# Patient Record
Sex: MALE | Race: WHITE | Employment: OTHER | URBAN - NONMETROPOLITAN AREA
[De-identification: names, ages, dates, MRNs, and addresses within clinical notes are randomized per-mention and may not be internally consistent; named-entity substitution may affect disease eponyms.]

---

## 2019-08-24 ENCOUNTER — APPOINTMENT (OUTPATIENT)
Dept: GENERAL RADIOLOGY | Age: 79
End: 2019-08-24
Payer: OTHER MISCELLANEOUS

## 2019-08-24 ENCOUNTER — APPOINTMENT (OUTPATIENT)
Dept: CT IMAGING | Age: 79
End: 2019-08-24
Payer: OTHER MISCELLANEOUS

## 2019-08-24 ENCOUNTER — HOSPITAL ENCOUNTER (EMERGENCY)
Age: 79
Discharge: OTHER INSTITUTION | End: 2019-08-24
Attending: EMERGENCY MEDICINE
Payer: OTHER MISCELLANEOUS

## 2019-08-24 VITALS
RESPIRATION RATE: 17 BRPM | HEIGHT: 72 IN | BODY MASS INDEX: 27.77 KG/M2 | SYSTOLIC BLOOD PRESSURE: 183 MMHG | TEMPERATURE: 97.7 F | HEART RATE: 45 BPM | WEIGHT: 205 LBS | OXYGEN SATURATION: 100 % | DIASTOLIC BLOOD PRESSURE: 83 MMHG
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
## 2019-08-24 DIAGNOSIS — M25.561 ACUTE PAIN OF RIGHT KNEE: ICD-10-CM

## 2019-08-24 DIAGNOSIS — M25.521 RIGHT ELBOW PAIN: ICD-10-CM

## 2019-08-24 DIAGNOSIS — R07.9 CHEST PAIN, UNSPECIFIED TYPE: ICD-10-CM

## 2019-08-24 DIAGNOSIS — R77.8 ELEVATED TROPONIN: ICD-10-CM

## 2019-08-24 DIAGNOSIS — T14.8XXA ABRASION: ICD-10-CM

## 2019-08-24 DIAGNOSIS — R07.81 RIB PAIN: ICD-10-CM

## 2019-08-24 LAB
ALBUMIN SERPL-MCNC: 4 GM/DL (ref 3.4–5)
ALP BLD-CCNC: 67 IU/L (ref 40–129)
ALT SERPL-CCNC: 31 U/L (ref 10–40)
ANION GAP SERPL CALCULATED.3IONS-SCNC: 17 MMOL/L (ref 4–16)
AST SERPL-CCNC: 44 IU/L (ref 15–37)
BACTERIA: ABNORMAL /HPF
BASOPHILS ABSOLUTE: 0.1 K/CU MM
BASOPHILS RELATIVE PERCENT: 0.6 % (ref 0–1)
BILIRUB SERPL-MCNC: 0.7 MG/DL (ref 0–1)
BILIRUBIN URINE: NEGATIVE MG/DL
BLOOD, URINE: ABNORMAL
BUN BLDV-MCNC: 15 MG/DL (ref 6–23)
CALCIUM SERPL-MCNC: 9.1 MG/DL (ref 8.3–10.6)
CAST TYPE: NEGATIVE /HPF
CHLORIDE BLD-SCNC: 106 MMOL/L (ref 99–110)
CLARITY: ABNORMAL
CO2: 20 MMOL/L (ref 21–32)
COLOR: YELLOW
CREAT SERPL-MCNC: 1.1 MG/DL (ref 0.9–1.3)
CRYSTAL TYPE: NEGATIVE /HPF
DIFFERENTIAL TYPE: ABNORMAL
EOSINOPHILS ABSOLUTE: 0.1 K/CU MM
EOSINOPHILS RELATIVE PERCENT: 1.1 % (ref 0–3)
EPITHELIAL CELLS, UA: ABNORMAL /HPF
GFR AFRICAN AMERICAN: >60 ML/MIN/1.73M2
GFR NON-AFRICAN AMERICAN: >60 ML/MIN/1.73M2
GLUCOSE BLD-MCNC: 103 MG/DL (ref 70–99)
GLUCOSE, URINE: NEGATIVE MG/DL
HCT VFR BLD CALC: 40 % (ref 42–52)
HEMOGLOBIN: 12.8 GM/DL (ref 13.5–18)
IMMATURE NEUTROPHIL %: 0.6 % (ref 0–0.43)
KETONES, URINE: ABNORMAL MG/DL
LEUKOCYTE ESTERASE, URINE: ABNORMAL
LIPASE: 58 IU/L (ref 13–60)
LYMPHOCYTES ABSOLUTE: 2.4 K/CU MM
LYMPHOCYTES RELATIVE PERCENT: 27.4 % (ref 24–44)
MCH RBC QN AUTO: 28.8 PG (ref 27–31)
MCHC RBC AUTO-ENTMCNC: 32 % (ref 32–36)
MCV RBC AUTO: 89.9 FL (ref 78–100)
MONOCYTES ABSOLUTE: 0.8 K/CU MM
MONOCYTES RELATIVE PERCENT: 8.9 % (ref 0–4)
NITRITE URINE, QUANTITATIVE: NEGATIVE
PDW BLD-RTO: 15.2 % (ref 11.7–14.9)
PH, URINE: 6 (ref 5–8)
PLATELET # BLD: 195 K/CU MM (ref 140–440)
PMV BLD AUTO: 11.6 FL (ref 7.5–11.1)
POTASSIUM SERPL-SCNC: 4.1 MMOL/L (ref 3.5–5.1)
PRO-BNP: 643.1 PG/ML
PROTEIN UA: ABNORMAL MG/DL
RBC # BLD: 4.45 M/CU MM (ref 4.6–6.2)
RBC URINE: ABNORMAL /HPF (ref 0–3)
SEGMENTED NEUTROPHILS ABSOLUTE COUNT: 5.5 K/CU MM
SEGMENTED NEUTROPHILS RELATIVE PERCENT: 61.4 % (ref 36–66)
SODIUM BLD-SCNC: 143 MMOL/L (ref 135–145)
SPECIFIC GRAVITY UA: 1.01 (ref 1–1.03)
TOTAL IMMATURE NEUTOROPHIL: 0.05 K/CU MM
TOTAL PROTEIN: 6.4 GM/DL (ref 6.4–8.2)
TROPONIN T: 0.01 NG/ML
UROBILINOGEN, URINE: 0.2 MG/DL (ref 0.2–1)
WBC # BLD: 8.9 K/CU MM (ref 4–10.5)
WBC UA: ABNORMAL /HPF (ref 0–2)

## 2019-08-24 PROCEDURE — 72131 CT LUMBAR SPINE W/O DYE: CPT

## 2019-08-24 PROCEDURE — 73562 X-RAY EXAM OF KNEE 3: CPT

## 2019-08-24 PROCEDURE — 96374 THER/PROPH/DIAG INJ IV PUSH: CPT

## 2019-08-24 PROCEDURE — 93005 ELECTROCARDIOGRAM TRACING: CPT | Performed by: EMERGENCY MEDICINE

## 2019-08-24 PROCEDURE — 81001 URINALYSIS AUTO W/SCOPE: CPT

## 2019-08-24 PROCEDURE — 73070 X-RAY EXAM OF ELBOW: CPT

## 2019-08-24 PROCEDURE — 85025 COMPLETE CBC W/AUTO DIFF WBC: CPT

## 2019-08-24 PROCEDURE — 72128 CT CHEST SPINE W/O DYE: CPT

## 2019-08-24 PROCEDURE — 6360000002 HC RX W HCPCS: Performed by: EMERGENCY MEDICINE

## 2019-08-24 PROCEDURE — 72125 CT NECK SPINE W/O DYE: CPT

## 2019-08-24 PROCEDURE — 70486 CT MAXILLOFACIAL W/O DYE: CPT

## 2019-08-24 PROCEDURE — 80053 COMPREHEN METABOLIC PANEL: CPT

## 2019-08-24 PROCEDURE — 96375 TX/PRO/DX INJ NEW DRUG ADDON: CPT

## 2019-08-24 PROCEDURE — 99285 EMERGENCY DEPT VISIT HI MDM: CPT

## 2019-08-24 PROCEDURE — 70450 CT HEAD/BRAIN W/O DYE: CPT

## 2019-08-24 PROCEDURE — 71045 X-RAY EXAM CHEST 1 VIEW: CPT

## 2019-08-24 PROCEDURE — 83690 ASSAY OF LIPASE: CPT

## 2019-08-24 PROCEDURE — 74174 CTA ABD&PLVS W/CONTRAST: CPT

## 2019-08-24 PROCEDURE — 83880 ASSAY OF NATRIURETIC PEPTIDE: CPT

## 2019-08-24 PROCEDURE — 84484 ASSAY OF TROPONIN QUANT: CPT

## 2019-08-24 PROCEDURE — 6360000004 HC RX CONTRAST MEDICATION: Performed by: EMERGENCY MEDICINE

## 2019-08-24 RX ORDER — LISINOPRIL 5 MG/1
5 TABLET ORAL DAILY
COMMUNITY

## 2019-08-24 RX ORDER — PREDNISOLONE ACETATE 10 MG/ML
1 SUSPENSION/ DROPS OPHTHALMIC DAILY
COMMUNITY

## 2019-08-24 RX ORDER — ATORVASTATIN CALCIUM 10 MG/1
10 TABLET, FILM COATED ORAL DAILY
COMMUNITY

## 2019-08-24 RX ORDER — AMIODARONE HYDROCHLORIDE 200 MG/1
200 TABLET ORAL DAILY
COMMUNITY

## 2019-08-24 RX ORDER — ONDANSETRON 2 MG/ML
4 INJECTION INTRAMUSCULAR; INTRAVENOUS EVERY 30 MIN PRN
Status: DISCONTINUED | OUTPATIENT
Start: 2019-08-24 | End: 2019-08-24 | Stop reason: HOSPADM

## 2019-08-24 RX ORDER — KETOROLAC TROMETHAMINE 5 MG/ML
1 SOLUTION OPHTHALMIC 4 TIMES DAILY
COMMUNITY

## 2019-08-24 RX ORDER — TAMSULOSIN HYDROCHLORIDE 0.4 MG/1
0.4 CAPSULE ORAL DAILY
COMMUNITY

## 2019-08-24 RX ORDER — MORPHINE SULFATE 2 MG/ML
2 INJECTION, SOLUTION INTRAMUSCULAR; INTRAVENOUS EVERY 30 MIN PRN
Status: DISCONTINUED | OUTPATIENT
Start: 2019-08-24 | End: 2019-08-24 | Stop reason: HOSPADM

## 2019-08-24 RX ADMIN — IOPAMIDOL 75 ML: 755 INJECTION, SOLUTION INTRAVENOUS at 12:19

## 2019-08-24 RX ADMIN — ONDANSETRON 4 MG: 2 INJECTION INTRAMUSCULAR; INTRAVENOUS at 11:27

## 2019-08-24 RX ADMIN — MORPHINE SULFATE 2 MG: 2 INJECTION, SOLUTION INTRAMUSCULAR; INTRAVENOUS at 11:27

## 2019-08-24 RX ADMIN — MORPHINE SULFATE 2 MG: 2 INJECTION, SOLUTION INTRAMUSCULAR; INTRAVENOUS at 15:06

## 2019-08-24 ASSESSMENT — PAIN DESCRIPTION - ORIENTATION: ORIENTATION: RIGHT

## 2019-08-24 ASSESSMENT — PAIN DESCRIPTION - PAIN TYPE: TYPE: ACUTE PAIN

## 2019-08-24 ASSESSMENT — ENCOUNTER SYMPTOMS
SHORTNESS OF BREATH: 1
ABDOMINAL PAIN: 1
ALLERGIC/IMMUNOLOGIC NEGATIVE: 1
EYES NEGATIVE: 1

## 2019-08-24 ASSESSMENT — PAIN SCALES - GENERAL
PAINLEVEL_OUTOF10: 10
PAINLEVEL_OUTOF10: 10
PAINLEVEL_OUTOF10: 3

## 2019-08-24 ASSESSMENT — PAIN DESCRIPTION - PROGRESSION: CLINICAL_PROGRESSION: GRADUALLY WORSENING

## 2019-08-24 ASSESSMENT — PAIN - FUNCTIONAL ASSESSMENT: PAIN_FUNCTIONAL_ASSESSMENT: PREVENTS OR INTERFERES SOME ACTIVE ACTIVITIES AND ADLS

## 2019-08-24 ASSESSMENT — PAIN DESCRIPTION - DESCRIPTORS: DESCRIPTORS: SHARP

## 2019-08-24 ASSESSMENT — PAIN DESCRIPTION - ONSET: ONSET: SUDDEN

## 2019-08-24 ASSESSMENT — PAIN DESCRIPTION - FREQUENCY: FREQUENCY: CONTINUOUS

## 2019-08-24 NOTE — ED PROVIDER NOTES
prednisoLONE acetate (PRED FORTE) 1 % ophthalmic suspension 1 drop daily      amiodarone (CORDARONE) 200 MG tablet Take 200 mg by mouth daily        No Known Allergies  Current Facility-Administered Medications   Medication Dose Route Frequency Provider Last Rate Last Dose    ondansetron (ZOFRAN) injection 4 mg  4 mg Intravenous Q30 Min PRN Rosaaracelisnd Acharya, DO   4 mg at 08/24/19 1127    morphine (PF) injection 2 mg  2 mg Intravenous Q30 Min PRN Lalit Acharya, DO   2 mg at 08/24/19 1127     Current Outpatient Medications   Medication Sig Dispense Refill    atorvastatin (LIPITOR) 10 MG tablet Take 10 mg by mouth daily      ketorolac (ACULAR) 0.5 % ophthalmic solution 1 drop 4 times daily      tamsulosin (FLOMAX) 0.4 MG capsule Take 0.4 mg by mouth daily      lisinopril (PRINIVIL;ZESTRIL) 5 MG tablet Take 5 mg by mouth daily      apixaban (ELIQUIS) 5 MG TABS tablet Take 5 mg by mouth 2 times daily      prednisoLONE acetate (PRED FORTE) 1 % ophthalmic suspension 1 drop daily      amiodarone (CORDARONE) 200 MG tablet Take 200 mg by mouth daily         Nursing Notes Reviewed    VITAL SIGNS:  ED Triage Vitals   Enc Vitals Group      BP       Pulse       Resp       Temp       Temp src       SpO2       Weight       Height       Head Circumference       Peak Flow       Pain Score       Pain Loc       Pain Edu? Excl. in [address]? PHYSICAL EXAM:  Physical Exam   Constitutional: He is oriented to person, place, and time. He appears well-developed and well-nourished. He is active and cooperative. Non-toxic appearance. He does not have a sickly appearance. He does not appear ill. No distress. Cervical collar in place. HENT:   Head: Normocephalic. Head is with abrasion. Right Ear: External ear normal.   Left Ear: External ear normal.   Mouth/Throat: Oropharynx is clear and moist. No oropharyngeal exudate. Eyes: Pupils are equal, round, and reactive to light.  Conjunctivae and EOM are normal. Right eye andinterpreted all of the currently available lab results from this visit (if applicable):    Results for orders placed or performed during the hospital encounter of 08/24/19   CBC Auto Differential   Result Value Ref Range    WBC 8.9 4.0 - 10.5 K/CU MM    RBC 4.45 (L) 4.6 - 6.2 M/CU MM    Hemoglobin 12.8 (L) 13.5 - 18.0 GM/DL    Hematocrit 40.0 (L) 42 - 52 %    MCV 89.9 78 - 100 FL    MCH 28.8 27 - 31 PG    MCHC 32.0 32.0 - 36.0 %    RDW 15.2 (H) 11.7 - 14.9 %    Platelets 522 064 - 479 K/CU MM    MPV 11.6 (H) 7.5 - 11.1 FL    Differential Type AUTOMATED DIFFERENTIAL     Segs Relative 61.4 36 - 66 %    Lymphocytes % 27.4 24 - 44 %    Monocytes % 8.9 (H) 0 - 4 %    Eosinophils % 1.1 0 - 3 %    Basophils % 0.6 0 - 1 %    Segs Absolute 5.5 K/CU MM    Lymphocytes Absolute 2.4 K/CU MM    Monocytes Absolute 0.8 K/CU MM    Eosinophils Absolute 0.1 K/CU MM    Basophils Absolute 0.1 K/CU MM    Immature Neutrophil % 0.6 (H) 0 - 0.43 %    Total Immature Neutrophil 0.05 K/CU MM   CMP   Result Value Ref Range    Sodium 143 135 - 145 MMOL/L    Potassium 4.1 3.5 - 5.1 MMOL/L    Chloride 106 99 - 110 mMol/L    CO2 20 (L) 21 - 32 MMOL/L    BUN 15 6 - 23 MG/DL    CREATININE 1.1 0.9 - 1.3 MG/DL    Glucose 103 (H) 70 - 99 MG/DL    Calcium 9.1 8.3 - 10.6 MG/DL    Alb 4.0 3.4 - 5.0 GM/DL    Total Protein 6.4 6.4 - 8.2 GM/DL    Total Bilirubin 0.7 0.0 - 1.0 MG/DL    ALT 31 10 - 40 U/L    AST 44 (H) 15 - 37 IU/L    Alkaline Phosphatase 67 40 - 129 IU/L    GFR Non-African American >60 >60 mL/min/1.73m2    GFR African American >60 >60 mL/min/1.73m2    Anion Gap 17 (H) 4 - 16   Lipase   Result Value Ref Range    Lipase 58 13 - 60 IU/L   Urinalysis (Lab)   Result Value Ref Range    Color, UA YELLOW YELLOW    Clarity, UA CLOUDY CLEAR    Glucose, Urine NEGATIVE NEGATIVE MG/DL    Bilirubin Urine NEGATIVE NEGATIVE MG/DL    Ketones, Urine TRACE NEGATIVE MG/DL    Specific Gravity, UA 1.010 1.001 - 1.035    Blood, Urine SMALL NEGATIVE    pH, Urine

## 2019-08-24 NOTE — ED NOTES
I have been in contact with the daughter, Raf Teixeira and she has been in contact with the son Mj Moore. The family is aware that the patient is alert and oriented and being transferred to [facility]. The family is aware of transport times and will meet the patient at [facility].                Oralia Tran RN  08/24/19 9012

## 2019-08-26 LAB
EKG ATRIAL RATE: 41 BPM
EKG DIAGNOSIS: NORMAL
EKG Q-T INTERVAL: 546 MS
EKG QRS DURATION: 120 MS
EKG QTC CALCULATION (BAZETT): 450 MS
EKG R AXIS: -9 DEGREES
EKG T AXIS: -1 DEGREES
EKG VENTRICULAR RATE: 41 BPM

## 2019-08-26 PROCEDURE — 93010 ELECTROCARDIOGRAM REPORT: CPT | Performed by: INTERNAL MEDICINE

## 2022-09-22 RX ORDER — LANOLIN ALCOHOL/MO/W.PET/CERES
500 CREAM (GRAM) TOPICAL NIGHTLY
COMMUNITY

## 2022-09-22 RX ORDER — LIDOCAINE 4 G/G
1 PATCH TOPICAL DAILY
COMMUNITY

## 2022-09-22 RX ORDER — GABAPENTIN 100 MG/1
100 CAPSULE ORAL 2 TIMES DAILY
COMMUNITY

## 2022-09-22 RX ORDER — TRIMETHOPRIM 100 MG/1
100 TABLET ORAL 2 TIMES DAILY
COMMUNITY

## 2022-09-22 RX ORDER — SILDENAFIL 25 MG/1
25 TABLET, FILM COATED ORAL PRN
COMMUNITY

## 2022-09-22 RX ORDER — FINASTERIDE 5 MG/1
5 TABLET, FILM COATED ORAL DAILY
COMMUNITY

## 2022-09-22 NOTE — PROGRESS NOTES
Surgery @ [facility] on 9/28/22 you will be called 9/27/22 with times               1. Do not eat or drink anything after midnight - unless instructed by your doctor prior to surgery. This includes                   no water, chewing gum or mints. 2. Follow your directions as prescribed by the doctor for your procedure and medications. Do not take any medications morning of surgery. 3. Check with your Doctor regarding stopping vitamins, supplements, blood thinners (Plavix, Coumadin, Lovenox, Effient, Pradaxa, Xarelto, Fragmin or                   other blood thinners) and follow their instructions. Stop vitamins, supplements and NSAIDS: 9/22/22,  Last dose Eliquis: 9/25/22   4. Do not smoke, vape or use chewing tobacco morning of surgery. Do not drink any alcoholic beverages 24 hours prior to surgery. This includes NA Beer. No street drugs 7 days prior to surgery. 5. You may brush your teeth and gargle the morning of surgery. DO NOT SWALLOW WATER   6. You MUST make arrangements for a responsible adult to take you home after your surgery and be able to check on you every couple                   hours for the day. You will not be allowed to leave alone or drive yourself home. It is strongly suggested someone stay with you the first 24                   hrs. Your surgery will be cancelled if you do not have a ride home. 7. Please wear simple, loose fitting clothing to the hospital.  [facility] not bring valuables (money, credit cards, checkbooks, etc.) Do not wear any                   makeup (including no eye makeup) or nail polish on your fingers or toes. 8. DO NOT wear any jewelry or piercings on day of surgery. All body piercing jewelry must be removed. 9. If you have dentures, they will be removed before going to the OR; we will provide you a container. If you wear contact lenses or glasses,                  they will be removed; please bring a case for them. 10. If you  have a Living Will and Durable Power of  for Healthcare, please bring in a copy. 11. Please bring picture ID,  insurance card, paperwork from the doctors office    (H & P, Consent, & card for implantable devices). 12. Take a shower the morning of your procedure with Hibiclens or an anti-bacterial soap. Do not apply any make-up, deodorant, lotion, oil or powder. 13.  Enter thru the main entrance wearing a mask on the day of surgery.

## 2022-09-22 NOTE — PROGRESS NOTES
9/22/22 - Dr. Reyna Lincoln office contacted, [facility] for the surgery scheduler. [facility] has patient listed as MAC anesthesia, we have him scheduled as a General. Please confirm which anesthesia is being used.

## 2022-09-22 NOTE — PROGRESS NOTES
9/22/22 - . LM with my call-back # concerning  surgery @ [facility] on  9/28/22. Please call the PAT Nurse for a phone assessment, surgery instructions and to set a day for pre-surgery labs/EKG.

## 2022-09-27 ENCOUNTER — ANESTHESIA EVENT (OUTPATIENT)
Dept: OPERATING ROOM | Age: 82
End: 2022-09-27
Payer: MEDICARE

## 2022-09-27 NOTE — ANESTHESIA PRE PROCEDURE
Department of Anesthesiology  Preprocedure Note       Name:  Mayra Marroquin   Age:  79 y.o.  :  1940                                          MRN:  [id number]         Date:  2022      Surgeon: Fely Jones):  Charlee Hayes MD    Procedure: Procedure(s):  CYSTOSCOPY TRANSURETHRAL RESECTION PROSTATE    Medications prior to admission:   Prior to Admission medications    Medication Sig Start Date End Date Taking? Authorizing Provider   finasteride (PROSCAR) 5 MG tablet Take 5 mg by mouth daily   Yes Historical Provider, MD   gabapentin (NEURONTIN) 100 MG capsule Take 100 mg by mouth in the morning and at bedtime.    Yes Historical Provider, MD   lidocaine (HM LIDOCAINE PATCH) 4 % external patch Place 1 patch onto the skin daily   Yes Historical Provider, MD   psyllium (KONSYL) 28.3 % PACK Take 1 packet by mouth 2 times daily   Yes Historical Provider, MD   trimethoprim (TRIMPEX) 100 MG tablet Take 100 mg by mouth 2 times daily   Yes Historical Provider, MD   niacin (SLO-NIACIN) 500 MG extended release tablet Take 500 mg by mouth nightly   Yes Historical Provider, MD   sildenafil (VIAGRA) 25 MG tablet Take 25 mg by mouth as needed for Erectile Dysfunction   Yes Historical Provider, MD   atorvastatin (LIPITOR) 10 MG tablet Take 10 mg by mouth daily    Historical Provider, MD   ketorolac (ACULAR) 0.5 % ophthalmic solution 1 drop 4 times daily    Historical Provider, MD   tamsulosin (FLOMAX) 0.4 MG capsule Take 0.4 mg by mouth daily    Historical Provider, MD   lisinopril (PRINIVIL;ZESTRIL) 5 MG tablet Take 5 mg by mouth daily    Historical Provider, MD   apixaban (ELIQUIS) 5 MG TABS tablet Take 5 mg by mouth 2 times daily    Historical Provider, MD   prednisoLONE acetate (PRED FORTE) 1 % ophthalmic suspension 1 drop daily    Historical Provider, MD   amiodarone (CORDARONE) 200 MG tablet Take 200 mg by mouth daily    Historical Provider, MD       Current medications:    No current facility-administered medications for this encounter. Current Outpatient Medications   Medication Sig Dispense Refill    finasteride (PROSCAR) 5 MG tablet Take 5 mg by mouth daily      gabapentin (NEURONTIN) 100 MG capsule Take 100 mg by mouth in the morning and at bedtime.  lidocaine (HM LIDOCAINE PATCH) 4 % external patch Place 1 patch onto the skin daily      psyllium (KONSYL) 28.3 % PACK Take 1 packet by mouth 2 times daily      trimethoprim (TRIMPEX) 100 MG tablet Take 100 mg by mouth 2 times daily      niacin (SLO-NIACIN) 500 MG extended release tablet Take 500 mg by mouth nightly      sildenafil (VIAGRA) 25 MG tablet Take 25 mg by mouth as needed for Erectile Dysfunction      atorvastatin (LIPITOR) 10 MG tablet Take 10 mg by mouth daily      ketorolac (ACULAR) 0.5 % ophthalmic solution 1 drop 4 times daily      tamsulosin (FLOMAX) 0.4 MG capsule Take 0.4 mg by mouth daily      lisinopril (PRINIVIL;ZESTRIL) 5 MG tablet Take 5 mg by mouth daily      apixaban (ELIQUIS) 5 MG TABS tablet Take 5 mg by mouth 2 times daily      prednisoLONE acetate (PRED FORTE) 1 % ophthalmic suspension 1 drop daily      amiodarone (CORDARONE) 200 MG tablet Take 200 mg by mouth daily         Allergies:  No Known Allergies    Problem List:  There is no problem list on file for this patient.       Past Medical History:        Diagnosis Date    Atrial fibrillation ([facility] [address]     follows with Mookie Cee MD @ [facility] - taking Eliquis    BPH (benign prostatic hyperplasia)     Cancer ([facility] [address]     Basil skin nose    ED (erectile dysfunction)     Hyperlipidemia     Hypertension        Past Surgical History:        Procedure Laterality Date    ABLATION OF DYSRHYTHMIC FOCUS  2015    a-flutter / a-fib   x2    APPENDECTOMY  2012    BACK SURGERY  2017    lumbar    BOWEL RESECTION  2014    diverticulitis    NECK SURGERY  2016    cervical    PROSTATE SURGERY  03/2022    URO-LIFT    SKIN BIOPSY      BCC nose       Social History:    Social History     Tobacco Use    Smoking status: Never    Smokeless tobacco: Never   Substance Use Topics    Alcohol use: Yes     Alcohol/week: 2.0 standard drinks     Types: 1 Glasses of wine, 1 Shots of liquor per week                                Counseling given: Not Answered      Vital Signs (Current):   Vitals:    09/22/22 1234   Weight: 199 lb (90.3 kg)   Height: 6' (1.829 m)                                              BP Readings from Last 3 Encounters:   08/24/19 (!) 183/83       NPO Status:                                                                                 BMI:   Wt Readings from Last 3 Encounters:   08/24/19 205 lb (93 kg)     Body mass index is 26.99 kg/m². CBC:   Lab Results   Component Value Date/Time    WBC 8.9 08/24/2019 11:20 AM    RBC 4.45 08/24/2019 11:20 AM    HGB 12.8 08/24/2019 11:20 AM    HCT 40.0 08/24/2019 11:20 AM    MCV 89.9 08/24/2019 11:20 AM    RDW 15.2 08/24/2019 11:20 AM     08/24/2019 11:20 AM       CMP:   Lab Results   Component Value Date/Time     08/24/2019 11:20 AM    K 4.1 08/24/2019 11:20 AM     08/24/2019 11:20 AM    CO2 20 08/24/2019 11:20 AM    BUN 15 08/24/2019 11:20 AM    CREATININE 1.1 08/24/2019 11:20 AM    GFRAA >60 08/24/2019 11:20 AM    LABGLOM >60 08/24/2019 11:20 AM    GLUCOSE 103 08/24/2019 11:20 AM    PROT 6.4 08/24/2019 11:20 AM    CALCIUM 9.1 08/24/2019 11:20 AM    BILITOT 0.7 08/24/2019 11:20 AM    ALKPHOS 67 08/24/2019 11:20 AM    AST 44 08/24/2019 11:20 AM    ALT 31 08/24/2019 11:20 AM       POC Tests: No results for input(s): POCGLU, POCNA, POCK, POCCL, POCBUN, POCHEMO, POCHCT in the last 72 hours.     Coags: No results found for: PROTIME, INR, APTT    HCG (If Applicable): No results found for: PREGTESTUR, PREGSERUM, HCG, HCGQUANT     ABGs: No results found for: PHART, PO2ART, OQA4CSN, RSW0VOA, BEART, Z7VGXOQE     Type & Screen (If Applicable):  No results found for: LABABO, LABRH    Drug/Infectious Status (If Applicable):  No results found for: HIV, HEPCAB    COVID-19 Screening (If Applicable): No results found for: COVID19        Anesthesia Evaluation    Airway:           Dental:          Pulmonary:                              Cardiovascular:    (+) hypertension:, dysrhythmias: atrial fibrillation,       ECG reviewed                     ROS comment: EKG on 08/26/2019  Marked sinus bradycardia with first degree AV block   Nonspecific intraventricular conduction delay   Borderline ECG   No previous ECGs available      Neuro/Psych:               GI/Hepatic/Renal:             Endo/Other:    (+) malignancy/cancer. ROS comment: Basal skin nose  Abdominal:             Vascular: Other Findings:           Anesthesia Plan      general     ASA 3       Induction: intravenous.                             MICHAEL Leger CRNA   9/27/2022

## 2022-09-27 NOTE — PROGRESS NOTES
9/27/22 - LM for patient: surgery @ [facility] on 9/28/22 @ 1300, arrival 1100. Please call with any questions.

## 2022-09-28 ENCOUNTER — HOSPITAL ENCOUNTER (OUTPATIENT)
Age: 82
Setting detail: OBSERVATION
Discharge: HOME | End: 2022-09-29
Attending: UROLOGY | Admitting: UROLOGY
Payer: MEDICARE

## 2022-09-28 ENCOUNTER — ANESTHESIA (OUTPATIENT)
Dept: OPERATING ROOM | Age: 82
End: 2022-09-28
Payer: MEDICARE

## 2022-09-28 DIAGNOSIS — Z01.818 PRE-OP TESTING: Primary | ICD-10-CM

## 2022-09-28 DIAGNOSIS — R33.9 RETENTION OF URINE, UNSPECIFIED: ICD-10-CM

## 2022-09-28 PROBLEM — R31.0 GROSS HEMATURIA: Status: ACTIVE | Noted: 2022-09-28

## 2022-09-28 LAB
ALBUMIN SERPL-MCNC: 4 GM/DL (ref 3.4–5)
ALP BLD-CCNC: 66 IU/L (ref 40–129)
ALT SERPL-CCNC: 12 U/L (ref 10–40)
ANION GAP SERPL CALCULATED.3IONS-SCNC: 18 MMOL/L (ref 4–16)
ANION GAP SERPL CALCULATED.3IONS-SCNC: 8 MMOL/L (ref 4–16)
ANION GAP SERPL CALCULATED.3IONS-SCNC: 8 MMOL/L (ref 4–16)
AST SERPL-CCNC: 19 IU/L (ref 15–37)
BILIRUB SERPL-MCNC: 0.7 MG/DL (ref 0–1)
BUN BLDV-MCNC: 15 MG/DL (ref 6–23)
BUN BLDV-MCNC: 17 MG/DL (ref 6–23)
BUN BLDV-MCNC: 7 MG/DL (ref 6–23)
CALCIUM SERPL-MCNC: 7 MG/DL (ref 8.3–10.6)
CALCIUM SERPL-MCNC: 9.2 MG/DL (ref 8.3–10.6)
CALCIUM SERPL-MCNC: 9.5 MG/DL (ref 8.3–10.6)
CHLORIDE BLD-SCNC: 105 MMOL/L (ref 99–110)
CHLORIDE BLD-SCNC: 106 MMOL/L (ref 99–110)
CHLORIDE BLD-SCNC: 111 MMOL/L (ref 99–110)
CO2: 11 MMOL/L (ref 21–32)
CO2: 25 MMOL/L (ref 21–32)
CO2: 26 MMOL/L (ref 21–32)
CREAT SERPL-MCNC: 0.8 MG/DL (ref 0.9–1.3)
CREAT SERPL-MCNC: 0.9 MG/DL (ref 0.9–1.3)
CREAT SERPL-MCNC: <0.5 MG/DL (ref 0.9–1.3)
DIFFERENTIAL TYPE: ABNORMAL
EOSINOPHILS ABSOLUTE: 0.1 K/CU MM
EOSINOPHILS RELATIVE PERCENT: 1 % (ref 0–3)
GFR AFRICAN AMERICAN: >60 ML/MIN/1.73M2
GFR AFRICAN AMERICAN: >60 ML/MIN/1.73M2
GFR AFRICAN AMERICAN: ABNORMAL ML/MIN/1.73M2
GFR NON-AFRICAN AMERICAN: >60 ML/MIN/1.73M2
GFR NON-AFRICAN AMERICAN: >60 ML/MIN/1.73M2
GFR NON-AFRICAN AMERICAN: ABNORMAL ML/MIN/1.73M2
GLUCOSE BLD-MCNC: 114 MG/DL (ref 70–99)
GLUCOSE BLD-MCNC: 118 MG/DL (ref 70–99)
GLUCOSE BLD-MCNC: 47 MG/DL (ref 70–99)
GLUCOSE BLD-MCNC: 81 MG/DL (ref 70–99)
GLUCOSE BLD-MCNC: 89 MG/DL (ref 70–99)
HCT VFR BLD CALC: 44.8 % (ref 42–52)
HEMOGLOBIN: 14.2 GM/DL (ref 13.5–18)
LACTATE: 1.9 MMOL/L (ref 0.4–2)
LYMPHOCYTES ABSOLUTE: 8.3 K/CU MM
LYMPHOCYTES RELATIVE PERCENT: 66 % (ref 24–44)
MCH RBC QN AUTO: 29.6 PG (ref 27–31)
MCHC RBC AUTO-ENTMCNC: 31.7 % (ref 32–36)
MCV RBC AUTO: 93.3 FL (ref 78–100)
MONOCYTES ABSOLUTE: 0.5 K/CU MM
MONOCYTES RELATIVE PERCENT: 4 % (ref 0–4)
PDW BLD-RTO: 14.3 % (ref 11.7–14.9)
PLATELET # BLD: 234 K/CU MM (ref 140–440)
PMV BLD AUTO: 11.3 FL (ref 7.5–11.1)
POTASSIUM SERPL-SCNC: 4.4 MMOL/L (ref 3.5–5.1)
POTASSIUM SERPL-SCNC: 4.8 MMOL/L (ref 3.5–5.1)
POTASSIUM SERPL-SCNC: 4.8 MMOL/L (ref 3.5–5.1)
PROCALCITONIN: 0.03
RBC # BLD: 4.8 M/CU MM (ref 4.6–6.2)
SEGMENTED NEUTROPHILS ABSOLUTE COUNT: 3.6 K/CU MM
SEGMENTED NEUTROPHILS RELATIVE PERCENT: 29 % (ref 36–66)
SODIUM BLD-SCNC: 135 MMOL/L (ref 135–145)
SODIUM BLD-SCNC: 139 MMOL/L (ref 135–145)
SODIUM BLD-SCNC: 144 MMOL/L (ref 135–145)
TOTAL PROTEIN: 5.5 GM/DL (ref 6.4–8.2)
WBC # BLD: 12.5 K/CU MM (ref 4–10.5)

## 2022-09-28 PROCEDURE — 94761 N-INVAS EAR/PLS OXIMETRY MLT: CPT

## 2022-09-28 PROCEDURE — 96372 THER/PROPH/DIAG INJ SC/IM: CPT

## 2022-09-28 PROCEDURE — 3600000013 HC SURGERY LEVEL 3 ADDTL 15MIN: Performed by: UROLOGY

## 2022-09-28 PROCEDURE — 6370000000 HC RX 637 (ALT 250 FOR IP): Performed by: ANESTHESIOLOGY

## 2022-09-28 PROCEDURE — 7100000001 HC PACU RECOVERY - ADDTL 15 MIN: Performed by: UROLOGY

## 2022-09-28 PROCEDURE — 80048 BASIC METABOLIC PNL TOTAL CA: CPT

## 2022-09-28 PROCEDURE — 2580000003 HC RX 258: Performed by: UROLOGY

## 2022-09-28 PROCEDURE — 85027 COMPLETE CBC AUTOMATED: CPT

## 2022-09-28 PROCEDURE — 36415 COLL VENOUS BLD VENIPUNCTURE: CPT

## 2022-09-28 PROCEDURE — 2500000003 HC RX 250 WO HCPCS

## 2022-09-28 PROCEDURE — 85007 BL SMEAR W/DIFF WBC COUNT: CPT

## 2022-09-28 PROCEDURE — 88305 TISSUE EXAM BY PATHOLOGIST: CPT | Performed by: PATHOLOGY

## 2022-09-28 PROCEDURE — 82962 GLUCOSE BLOOD TEST: CPT

## 2022-09-28 PROCEDURE — 7100000000 HC PACU RECOVERY - FIRST 15 MIN: Performed by: UROLOGY

## 2022-09-28 PROCEDURE — 7100000010 HC PHASE II RECOVERY - FIRST 15 MIN: Performed by: UROLOGY

## 2022-09-28 PROCEDURE — 6360000002 HC RX W HCPCS

## 2022-09-28 PROCEDURE — 3700000000 HC ANESTHESIA ATTENDED CARE: Performed by: UROLOGY

## 2022-09-28 PROCEDURE — 3600000003 HC SURGERY LEVEL 3 BASE: Performed by: UROLOGY

## 2022-09-28 PROCEDURE — 84145 PROCALCITONIN (PCT): CPT

## 2022-09-28 PROCEDURE — 6370000000 HC RX 637 (ALT 250 FOR IP): Performed by: UROLOGY

## 2022-09-28 PROCEDURE — 7100000011 HC PHASE II RECOVERY - ADDTL 15 MIN: Performed by: UROLOGY

## 2022-09-28 PROCEDURE — G0378 HOSPITAL OBSERVATION PER HR: HCPCS

## 2022-09-28 PROCEDURE — 3700000001 HC ADD 15 MINUTES (ANESTHESIA): Performed by: UROLOGY

## 2022-09-28 PROCEDURE — A4217 STERILE WATER/SALINE, 500 ML: HCPCS | Performed by: UROLOGY

## 2022-09-28 PROCEDURE — 2709999900 HC NON-CHARGEABLE SUPPLY: Performed by: UROLOGY

## 2022-09-28 PROCEDURE — 6360000002 HC RX W HCPCS: Performed by: UROLOGY

## 2022-09-28 PROCEDURE — C1769 GUIDE WIRE: HCPCS | Performed by: UROLOGY

## 2022-09-28 PROCEDURE — 83605 ASSAY OF LACTIC ACID: CPT

## 2022-09-28 PROCEDURE — 80053 COMPREHEN METABOLIC PANEL: CPT

## 2022-09-28 RX ORDER — IPRATROPIUM BROMIDE AND ALBUTEROL SULFATE 2.5; .5 MG/3ML; MG/3ML
1 SOLUTION RESPIRATORY (INHALATION)
Status: DISCONTINUED | OUTPATIENT
Start: 2022-09-28 | End: 2022-09-28 | Stop reason: HOSPADM

## 2022-09-28 RX ORDER — ACETAMINOPHEN 650 MG/1
650 SUPPOSITORY RECTAL EVERY 4 HOURS PRN
Status: DISCONTINUED | OUTPATIENT
Start: 2022-09-28 | End: 2022-09-29 | Stop reason: HOSPADM

## 2022-09-28 RX ORDER — HYDRALAZINE HYDROCHLORIDE 20 MG/ML
10 INJECTION INTRAMUSCULAR; INTRAVENOUS
Status: DISCONTINUED | OUTPATIENT
Start: 2022-09-28 | End: 2022-09-28 | Stop reason: HOSPADM

## 2022-09-28 RX ORDER — KETAMINE HCL 50MG/ML(1)
SYRINGE (ML) INTRAVENOUS PRN
Status: DISCONTINUED | OUTPATIENT
Start: 2022-09-28 | End: 2022-09-28 | Stop reason: SDUPTHER

## 2022-09-28 RX ORDER — OXYCODONE HYDROCHLORIDE AND ACETAMINOPHEN 5; 325 MG/1; MG/1
1 TABLET ORAL EVERY 6 HOURS PRN
Status: DISCONTINUED | OUTPATIENT
Start: 2022-09-28 | End: 2022-09-29 | Stop reason: HOSPADM

## 2022-09-28 RX ORDER — TRAMADOL HYDROCHLORIDE 50 MG/1
50 TABLET ORAL EVERY 4 HOURS PRN
Status: DISCONTINUED | OUTPATIENT
Start: 2022-09-28 | End: 2022-09-29 | Stop reason: HOSPADM

## 2022-09-28 RX ORDER — CIPROFLOXACIN 250 MG/1
250 TABLET, FILM COATED ORAL 2 TIMES DAILY
Qty: 10 TABLET | Refills: 0 | Status: SHIPPED | OUTPATIENT
Start: 2022-09-28 | End: 2022-10-03

## 2022-09-28 RX ORDER — OXYCODONE HYDROCHLORIDE 5 MG/1
10 TABLET ORAL PRN
Status: COMPLETED | OUTPATIENT
Start: 2022-09-28 | End: 2022-09-28

## 2022-09-28 RX ORDER — SODIUM CHLORIDE 0.9 % (FLUSH) 0.9 %
5-40 SYRINGE (ML) INJECTION PRN
Status: DISCONTINUED | OUTPATIENT
Start: 2022-09-28 | End: 2022-09-28 | Stop reason: HOSPADM

## 2022-09-28 RX ORDER — SODIUM CHLORIDE, SODIUM LACTATE, POTASSIUM CHLORIDE, CALCIUM CHLORIDE 600; 310; 30; 20 MG/100ML; MG/100ML; MG/100ML; MG/100ML
INJECTION, SOLUTION INTRAVENOUS CONTINUOUS
Status: DISCONTINUED | OUTPATIENT
Start: 2022-09-28 | End: 2022-09-28 | Stop reason: HOSPADM

## 2022-09-28 RX ORDER — SODIUM CHLORIDE 0.9 % (FLUSH) 0.9 %
5-40 SYRINGE (ML) INJECTION EVERY 12 HOURS SCHEDULED
Status: DISCONTINUED | OUTPATIENT
Start: 2022-09-28 | End: 2022-09-28 | Stop reason: HOSPADM

## 2022-09-28 RX ORDER — MAGNESIUM HYDROXIDE 1200 MG/15ML
LIQUID ORAL CONTINUOUS PRN
Status: COMPLETED | OUTPATIENT
Start: 2022-09-28 | End: 2022-09-28

## 2022-09-28 RX ORDER — FENTANYL CITRATE 50 UG/ML
25 INJECTION, SOLUTION INTRAMUSCULAR; INTRAVENOUS EVERY 5 MIN PRN
Status: DISCONTINUED | OUTPATIENT
Start: 2022-09-28 | End: 2022-09-28 | Stop reason: HOSPADM

## 2022-09-28 RX ORDER — PROPOFOL 10 MG/ML
INJECTION, EMULSION INTRAVENOUS PRN
Status: DISCONTINUED | OUTPATIENT
Start: 2022-09-28 | End: 2022-09-28 | Stop reason: SDUPTHER

## 2022-09-28 RX ORDER — CIPROFLOXACIN 500 MG/1
250 TABLET, FILM COATED ORAL EVERY 12 HOURS SCHEDULED
Status: DISCONTINUED | OUTPATIENT
Start: 2022-09-28 | End: 2022-09-29 | Stop reason: HOSPADM

## 2022-09-28 RX ORDER — TAMSULOSIN HYDROCHLORIDE 0.4 MG/1
0.4 CAPSULE ORAL DAILY
Status: DISCONTINUED | OUTPATIENT
Start: 2022-09-28 | End: 2022-09-29 | Stop reason: HOSPADM

## 2022-09-28 RX ORDER — SODIUM CHLORIDE 9 MG/ML
25 INJECTION, SOLUTION INTRAVENOUS PRN
Status: DISCONTINUED | OUTPATIENT
Start: 2022-09-28 | End: 2022-09-28 | Stop reason: HOSPADM

## 2022-09-28 RX ORDER — LISINOPRIL 5 MG/1
5 TABLET ORAL DAILY
Status: DISCONTINUED | OUTPATIENT
Start: 2022-09-29 | End: 2022-09-29 | Stop reason: HOSPADM

## 2022-09-28 RX ORDER — GLYCOPYRROLATE 0.2 MG/ML
INJECTION INTRAMUSCULAR; INTRAVENOUS PRN
Status: DISCONTINUED | OUTPATIENT
Start: 2022-09-28 | End: 2022-09-28 | Stop reason: SDUPTHER

## 2022-09-28 RX ORDER — DROPERIDOL 2.5 MG/ML
0.62 INJECTION, SOLUTION INTRAMUSCULAR; INTRAVENOUS
Status: DISCONTINUED | OUTPATIENT
Start: 2022-09-28 | End: 2022-09-28 | Stop reason: HOSPADM

## 2022-09-28 RX ORDER — ONDANSETRON 2 MG/ML
4 INJECTION INTRAMUSCULAR; INTRAVENOUS
Status: DISCONTINUED | OUTPATIENT
Start: 2022-09-28 | End: 2022-09-28 | Stop reason: HOSPADM

## 2022-09-28 RX ORDER — ATORVASTATIN CALCIUM 10 MG/1
10 TABLET, FILM COATED ORAL DAILY
Status: DISCONTINUED | OUTPATIENT
Start: 2022-09-29 | End: 2022-09-29 | Stop reason: HOSPADM

## 2022-09-28 RX ORDER — TRAMADOL HYDROCHLORIDE 50 MG/1
50 TABLET ORAL EVERY 8 HOURS PRN
Qty: 12 TABLET | Refills: 0 | Status: SHIPPED | OUTPATIENT
Start: 2022-09-28 | End: 2022-10-03

## 2022-09-28 RX ORDER — ENOXAPARIN SODIUM 100 MG/ML
40 INJECTION SUBCUTANEOUS NIGHTLY
Status: DISCONTINUED | OUTPATIENT
Start: 2022-09-28 | End: 2022-09-29 | Stop reason: HOSPADM

## 2022-09-28 RX ORDER — DEXTROSE MONOHYDRATE 100 MG/ML
1000 INJECTION, SOLUTION INTRAVENOUS CONTINUOUS PRN
Status: DISCONTINUED | OUTPATIENT
Start: 2022-09-28 | End: 2022-09-29 | Stop reason: HOSPADM

## 2022-09-28 RX ORDER — OXYCODONE HYDROCHLORIDE 5 MG/1
5 TABLET ORAL PRN
Status: COMPLETED | OUTPATIENT
Start: 2022-09-28 | End: 2022-09-28

## 2022-09-28 RX ORDER — LABETALOL HYDROCHLORIDE 5 MG/ML
10 INJECTION, SOLUTION INTRAVENOUS
Status: DISCONTINUED | OUTPATIENT
Start: 2022-09-28 | End: 2022-09-28 | Stop reason: HOSPADM

## 2022-09-28 RX ORDER — ONDANSETRON 2 MG/ML
INJECTION INTRAMUSCULAR; INTRAVENOUS PRN
Status: DISCONTINUED | OUTPATIENT
Start: 2022-09-28 | End: 2022-09-28 | Stop reason: SDUPTHER

## 2022-09-28 RX ORDER — GABAPENTIN 100 MG/1
100 CAPSULE ORAL 2 TIMES DAILY
Status: DISCONTINUED | OUTPATIENT
Start: 2022-09-28 | End: 2022-09-29 | Stop reason: HOSPADM

## 2022-09-28 RX ORDER — ONDANSETRON 2 MG/ML
4 INJECTION INTRAMUSCULAR; INTRAVENOUS EVERY 6 HOURS PRN
Status: DISCONTINUED | OUTPATIENT
Start: 2022-09-28 | End: 2022-09-29 | Stop reason: HOSPADM

## 2022-09-28 RX ORDER — AMIODARONE HYDROCHLORIDE 200 MG/1
200 TABLET ORAL DAILY
Status: DISCONTINUED | OUTPATIENT
Start: 2022-09-29 | End: 2022-09-29 | Stop reason: HOSPADM

## 2022-09-28 RX ORDER — FINASTERIDE 5 MG/1
5 TABLET, FILM COATED ORAL DAILY
Status: DISCONTINUED | OUTPATIENT
Start: 2022-09-28 | End: 2022-09-29 | Stop reason: HOSPADM

## 2022-09-28 RX ORDER — DEXAMETHASONE SODIUM PHOSPHATE 4 MG/ML
INJECTION, SOLUTION INTRA-ARTICULAR; INTRALESIONAL; INTRAMUSCULAR; INTRAVENOUS; SOFT TISSUE PRN
Status: DISCONTINUED | OUTPATIENT
Start: 2022-09-28 | End: 2022-09-28 | Stop reason: SDUPTHER

## 2022-09-28 RX ADMIN — Medication 1 PACKET: at 21:59

## 2022-09-28 RX ADMIN — HYDROMORPHONE HYDROCHLORIDE 0.5 MG: 1 INJECTION, SOLUTION INTRAMUSCULAR; INTRAVENOUS; SUBCUTANEOUS at 13:19

## 2022-09-28 RX ADMIN — OXYCODONE HYDROCHLORIDE 5 MG: 5 TABLET ORAL at 16:22

## 2022-09-28 RX ADMIN — DEXAMETHASONE SODIUM PHOSPHATE 4 MG: 4 INJECTION, SOLUTION INTRAMUSCULAR; INTRAVENOUS at 13:19

## 2022-09-28 RX ADMIN — FINASTERIDE 5 MG: 5 TABLET, FILM COATED ORAL at 18:33

## 2022-09-28 RX ADMIN — CEFAZOLIN 2000 MG: 2 INJECTION, POWDER, FOR SOLUTION INTRAMUSCULAR; INTRAVENOUS at 13:04

## 2022-09-28 RX ADMIN — Medication 10 MG: at 13:42

## 2022-09-28 RX ADMIN — GLYCOPYRROLATE 0.3 MG: 0.2 INJECTION INTRAMUSCULAR; INTRAVENOUS at 13:49

## 2022-09-28 RX ADMIN — SODIUM CHLORIDE, POTASSIUM CHLORIDE, SODIUM LACTATE AND CALCIUM CHLORIDE: 600; 310; 30; 20 INJECTION, SOLUTION INTRAVENOUS at 11:26

## 2022-09-28 RX ADMIN — Medication 10 MG: at 13:22

## 2022-09-28 RX ADMIN — TRAMADOL HYDROCHLORIDE 50 MG: 50 TABLET, COATED ORAL at 21:59

## 2022-09-28 RX ADMIN — GABAPENTIN 100 MG: 100 CAPSULE ORAL at 22:00

## 2022-09-28 RX ADMIN — Medication 10 MG: at 13:49

## 2022-09-28 RX ADMIN — TAMSULOSIN HYDROCHLORIDE 0.4 MG: 0.4 CAPSULE ORAL at 18:33

## 2022-09-28 RX ADMIN — Medication 10 MG: at 13:35

## 2022-09-28 RX ADMIN — ONDANSETRON 4 MG: 2 INJECTION INTRAMUSCULAR; INTRAVENOUS at 14:44

## 2022-09-28 RX ADMIN — ENOXAPARIN SODIUM 40 MG: 40 INJECTION SUBCUTANEOUS at 22:01

## 2022-09-28 RX ADMIN — Medication 10 MG: at 13:29

## 2022-09-28 RX ADMIN — HYDROMORPHONE HYDROCHLORIDE 0.5 MG: 1 INJECTION, SOLUTION INTRAMUSCULAR; INTRAVENOUS; SUBCUTANEOUS at 14:03

## 2022-09-28 RX ADMIN — PROPOFOL 100 MG: 10 INJECTION, EMULSION INTRAVENOUS at 13:12

## 2022-09-28 RX ADMIN — CIPROFLOXACIN 250 MG: 500 TABLET, FILM COATED ORAL at 22:00

## 2022-09-28 ASSESSMENT — PAIN DESCRIPTION - LOCATION
LOCATION: PENIS
LOCATION: THROAT
LOCATION: FLANK
LOCATION: BUTTOCKS
LOCATION: PENIS
LOCATION: THROAT

## 2022-09-28 ASSESSMENT — PAIN DESCRIPTION - DESCRIPTORS
DESCRIPTORS: BURNING
DESCRIPTORS: SORE
DESCRIPTORS: CRAMPING;ACHING
DESCRIPTORS: SORE

## 2022-09-28 ASSESSMENT — PAIN - FUNCTIONAL ASSESSMENT
PAIN_FUNCTIONAL_ASSESSMENT: ACTIVITIES ARE NOT PREVENTED
PAIN_FUNCTIONAL_ASSESSMENT: ACTIVITIES ARE NOT PREVENTED
PAIN_FUNCTIONAL_ASSESSMENT: PREVENTS OR INTERFERES SOME ACTIVE ACTIVITIES AND ADLS

## 2022-09-28 ASSESSMENT — ENCOUNTER SYMPTOMS
EYES NEGATIVE: 1
NAUSEA: 0
ABDOMINAL PAIN: 0
SORE THROAT: 0
CONSTIPATION: 0
SHORTNESS OF BREATH: 0
VOMITING: 0
COUGH: 0

## 2022-09-28 ASSESSMENT — PAIN DESCRIPTION - ONSET
ONSET: ON-GOING

## 2022-09-28 ASSESSMENT — PAIN SCALES - GENERAL
PAINLEVEL_OUTOF10: 1
PAINLEVEL_OUTOF10: 0
PAINLEVEL_OUTOF10: 1
PAINLEVEL_OUTOF10: 7
PAINLEVEL_OUTOF10: 1
PAINLEVEL_OUTOF10: 5
PAINLEVEL_OUTOF10: 1
PAINLEVEL_OUTOF10: 1

## 2022-09-28 ASSESSMENT — PAIN DESCRIPTION - FREQUENCY
FREQUENCY: INTERMITTENT
FREQUENCY: CONTINUOUS

## 2022-09-28 ASSESSMENT — PAIN DESCRIPTION - ORIENTATION
ORIENTATION: RIGHT
ORIENTATION: LEFT
ORIENTATION: LEFT

## 2022-09-28 ASSESSMENT — PAIN DESCRIPTION - PAIN TYPE
TYPE: ACUTE PAIN

## 2022-09-28 NOTE — DISCHARGE INSTRUCTIONS
No heavy activity for 4 weeks  Hold Eliquis for 7 days- hold if any significant bleeding after procedure  No driving for 48 hours or on ultram  Follow-up in 7 days for urinary catheter removal  Continue hydration and avoid constipation  Motrin/tylenol OTC PRN mild pain  May shower in 24 hours but no bath  No bike riding for 24 hours  Call or return to hospital with questions or concerns                [facility]  [phone]    Do not drive, work around [address] [place], [place] [address]th  or use equipment. Do not drink any alcoholic beverages. Do not smoke while alone. Avoid making important decisions. Plan to spend a quiet, relaxed evening @ home. Resume normal activities as you begin to feel better. Eat lightly for your first meal, then gradually increase your diet to what is normal for you. In case of nausea, avoid food and drink only clear liquids. Resume food as nausea ceases. Notify your surgeon if you experience fever, chills, large amount of bleeding, difficulty breathing, persistent nausea and vomiting or any other disturbing problem. Call for a follow-up appointment with your surgeon. Advance Care Planning  People with COVID-19 may have no symptoms, mild symptoms, such as fever, cough, and shortness of breath or they may have more severe illness, developing severe and fatal pneumonia. As a result, Advance Care Planning with attention to naming a health care decision maker (someone you trust to make healthcare decisions for you if you could not speak for yourself) and sharing other health care preferences is important BEFORE a possible health crisis. Please contact your Primary Care Provider to discuss Advance Care Planning.     Preventing the Spread of Coronavirus Disease 2019 in Homes and Residential Communities  For the most recent information go to [employer]    Prevention steps for People with confirmed or suspected COVID-19 (including persons under investigation) who do not need to be hospitalized  and   People with confirmed COVID-19 who were hospitalized and determined to be medically stable to go home    Your healthcare provider and public health staff will evaluate whether you can be cared for at home. If it is determined that you do not need to be hospitalized and can be isolated at home, you will be monitored by staff from your local or state health department. You should follow the prevention steps below until a healthcare provider or local or state health department says you can return to your normal activities. Stay home except to get medical care  People who are mildly ill with COVID-19 are able to isolate at home during their illness. You should restrict activities outside your home, except for getting medical care. Do not go to work, school, or public areas. Avoid using public transportation, ride-sharing, or taxis. Separate yourself from other people and animals in your home  People: As much as possible, you should stay in a specific room and away from other people in your home. Also, you should use a separate bathroom, if available. Animals: You should restrict contact with pets and other animals while you are sick with COVID-19, just like you would around other people. Although there have not been reports of pets or other animals becoming sick with COVID-19, it is still recommended that people sick with COVID-19 limit contact with animals until more information is known about the virus. When possible, have another member of your household care for your animals while you are sick. If you are sick with COVID-19, avoid contact with your pet, including petting, snuggling, being kissed or licked, and sharing food. If you must care for your pet or be around animals while you are sick, wash your hands before and after you interact with pets and wear a facemask.   Call ahead before visiting your doctor  If you have a medical appointment, call the healthcare provider and tell them that you have or may have COVID-19. This will help the healthcare providers office take steps to keep other people from getting infected or exposed. Wear a facemask  You should wear a facemask when you are around other people (e.g., sharing a room or vehicle) or pets and before you enter a healthcare providers office. If you are not able to wear a facemask (for example, because it causes trouble breathing), then people who live with you should not stay in the same room with you, or they should wear a facemask if they enter your room. Cover your coughs and sneezes  Cover your mouth and nose with a tissue when you cough or sneeze. Throw used tissues in a lined trash can. Immediately wash your hands with soap and water for at least 20 seconds or, if soap and water are not available, clean your hands with an alcohol-based hand  that contains at least 60% alcohol. Clean your hands often  Wash your hands often with soap and water for at least 20 seconds, especially after blowing your nose, coughing, or sneezing; going to the bathroom; and before eating or preparing food. If soap and water are not readily available, use an alcohol-based hand  with at least 60% alcohol, covering all surfaces of your hands and rubbing them together until they feel dry. Soap and water are the best option if hands are visibly dirty. Avoid touching your eyes, nose, and mouth with unwashed hands. Avoid sharing personal household items  You should not share dishes, drinking glasses, cups, eating utensils, towels, or bedding with other people or pets in your home. After using these items, they should be washed thoroughly with soap and water. Clean all high-touch surfaces everyday  High touch surfaces include counters, tabletops, doorknobs, bathroom fixtures, toilets, phones, keyboards, tablets, and bedside tables.  Also, clean any surfaces that may have blood, stool, or body fluids on them. Use a household cleaning spray or wipe, according to the label instructions. Labels contain instructions for safe and effective use of the cleaning product including precautions you should take when applying the product, such as wearing gloves and making sure you have good ventilation during use of the product. Monitor your symptoms  Seek prompt medical attention if your illness is worsening (e.g., difficulty breathing). Before seeking care, call your healthcare provider and tell them that you have, or are being evaluated for, COVID-19. Put on a facemask before you enter the facility. These steps will help the healthcare providers office to keep other people in the office or waiting room from getting infected or exposed. Ask your healthcare provider to call the local or state health department. Persons who are placed under active monitoring or facilitated self-monitoring should follow instructions provided by their local health department or occupational health professionals, as appropriate. When working with your local health department check their available hours. If you have a medical emergency and need to call 911, notify the dispatch personnel that you have, or are being evaluated for COVID-19. If possible, put on a facemask before emergency medical services arrive. Discontinuing home isolation  Patients with confirmed COVID-19 should remain under home isolation precautions until the risk of secondary transmission to others is thought to be low. The decision to discontinue home isolation precautions should be made on a case-by-case basis, in consultation with healthcare providers and state and local health departments.

## 2022-09-28 NOTE — BRIEF OP NOTE
Brief Postoperative Note      Patient: Amalia Lucero  YOB: 1940  MRN: [id number]    Date of Procedure: 9/28/2022    Pre-Op Diagnosis: Chronic BPH with LUTS                                 Intermittent Urinary retention    Post-Op Diagnosis: Same       Procedure(s):  CYSTOSCOPY TRANSURETHRAL RESECTION PROSTATE    Surgeon(s):  Liang Hilario MD    Anesthesia: General    Estimated Blood Loss (mL): less than 50     Complications: None    Specimens:   ID Type Source Tests Collected by Time Destination   A : prostate tissue Tissue Prostate SURGICAL PATHOLOGY Liang Hilario MD 9/28/2022 1431          Drains:   Urinary Catheter 09/28/22 3 Way (Active)   With catehter plug    Findings:    Resection time less than 1 hour, landmarks bladder neck to veru  Both UO's seen and unharmed  Two urolift clips removed- right lateral and left more anterior  Prostatic urethra widely patent after resection  Anterior tissue resected  Prostate 45 grams on YORDY without nodules  Catheter irrigated clear and no leakage around catheter after TURP    Electronically signed by Liang Hilario MD on 9/28/2022 at 2:49 PM

## 2022-09-28 NOTE — CONSULTS
V2.0  [facility] Consult Note      Name:  Celestino Trimble /Age/Sex: 1940  (79 y.o. male)   MRN & CSN:  [id number] & [id number] Encounter Date/Time: 2022 5:07 PM EDT   Location:  [address] [place] [address] PCP: No primary care provider on file. [address] Monica Tovar MD  Consulting Provider: Jaynee Mohs, APRN - [id number] [facility] Day: 1    Assessment and Recommendations   Celestino Trimble is a 79 y.o. male with pmh of  HTN, HLD, afib  who presents with Gross hematuria    Hospital Problems             Last Modified POA    * (Principal) Gross hematuria 2022 Yes       Recommendations:    Random point-of-care hypoglycemia with elevated anion gap and hypocalcemia   Suspect contaminated blood draw-likely mixed with IV fluid   Check point-of-care blood glucose   Hypoglycemia protocol ordered   Repeat chemistry stat   Patient alert and oriented-hold off on treatment pending results  Gross hematuria  Benign prostatic hyperplasia   Status post cystoscopy with TURP per Dr. Sapphire Christie and urology care per urology   On empiric ciprofloxacin per urology   Continue home Flomax, Proscar  Atrial fibrillation   Follows with Dr. Macrina Andrea at [address] [place] [address] Eliquis  preoperatively. Continue home amiodarone. Telemetry 24 hours postoperatively. Resume Eliquis once cleared per urology cleared. By cardiology preoperatively    Hypertension, essential   Continue home lisinopril. As needed IVPB metoprolol for SBP greater than 180 and heart rate greater than 65  Hyperlipidemia-continue home Lipitor        Diet ADULT DIET;  Regular   DVT Prophylaxis [] Lovenox, []  Heparin, [x] SCDs, [x] Ambulation,  [] Eliquis, [] Xarelto   Code Status Full Code   Surrogate Decision Maker/ POA     Hi  History Obtained From:    patient, family member -     History of Present Illness:     Chief Complaint: Gross hematuria    Celestino Trimble is a 79 y.o. male who is seen today by the hospitalist team at the request of Dr. Saran Fuentes with a chief complaint of gross hematuria status post TURP. Patient seen and examined postoperatively in MedSurg unit. Patient alert and oriented. Very hard of hearing. Reads lips. Denies any complaints. Family member at bedside. Updated on plan of care. Denies current concerns. Denies pain. Review of Systems:    Review of Systems   Constitutional: Negative. Negative for fever and unexpected weight change. HENT:  Negative for congestion and sore throat. Eyes: Negative. Respiratory:  Negative for cough and shortness of breath. Cardiovascular:  Negative for chest pain and leg swelling. Gastrointestinal:  Negative for abdominal pain, constipation, nausea and vomiting. Endocrine: Negative. Genitourinary:  Negative for dysuria and hematuria. Musculoskeletal:  Negative for neck pain. Skin:  Negative for wound. Neurological:  Negative for dizziness and light-headedness. All other systems reviewed and are negative. Objective: Intake/Output Summary (Last 24 hours) at 9/28/2022 1707  Last data filed at 9/28/2022 1457  Gross per 24 hour   Intake 542.5 ml   Output 5 ml   Net 537.5 ml      Vitals:   Vitals:    09/28/22 1537 09/28/22 1553 09/28/22 1624 09/28/22 1704   BP: (!) 169/103  (!) 168/100 (!) 158/98   Pulse: 71 67 67 68   Resp: 16 16 16 16   Temp: 96.8 °F (36 °C) 96.9 °F (36.1 °C)     TempSrc: Tympanic Temporal  Oral   SpO2: 98% 97%  98%   Weight:       Height:           Medications Prior to Admission     Prior to Admission medications    Medication Sig Start Date End Date Taking? Authorizing Provider   ciprofloxacin (CIPRO) 250 MG tablet Take 1 tablet by mouth 2 times daily for 5 days 9/28/22 10/3/22 Yes Veronika Hartman MD   traMADol (ULTRAM) 50 MG tablet Take 1 tablet by mouth every 8 hours as needed for Pain for up to 5 days. Intended supply: 5 days.  Take lowest dose possible to manage pain 9/28/22 10/3/22 Yes Veronika Hartman MD   finasteride (PROSCAR) 5 MG tablet Take 5 mg by mouth daily   Yes Historical Provider, MD   gabapentin (NEURONTIN) 100 MG capsule Take 100 mg by mouth in the morning and at bedtime. Yes Historical Provider, MD   lidocaine 4 % external patch Place 1 patch onto the skin daily   Yes Historical Provider, MD   psyllium (KONSYL) 28.3 % PACK Take 1 packet by mouth 2 times daily   Yes Historical Provider, MD   trimethoprim (TRIMPEX) 100 MG tablet Take 100 mg by mouth 2 times daily   Yes Historical Provider, MD   niacin (SLO-NIACIN) 500 MG extended release tablet Take 500 mg by mouth nightly   Yes Historical Provider, MD   sildenafil (VIAGRA) 25 MG tablet Take 25 mg by mouth as needed for Erectile Dysfunction   Yes Historical Provider, MD   atorvastatin (LIPITOR) 10 MG tablet Take 10 mg by mouth daily    Historical Provider, MD   ketorolac (ACULAR) 0.5 % ophthalmic solution 1 drop 4 times daily    Historical Provider, MD   tamsulosin (FLOMAX) 0.4 MG capsule Take 0.4 mg by mouth daily    Historical Provider, MD   lisinopril (PRINIVIL;ZESTRIL) 5 MG tablet Take 5 mg by mouth daily    Historical Provider, MD   apixaban (ELIQUIS) 5 MG TABS tablet Take 5 mg by mouth 2 times daily    Historical Provider, MD   prednisoLONE acetate (PRED FORTE) 1 % ophthalmic suspension 1 drop daily    Historical Provider, MD   amiodarone (CORDARONE) 200 MG tablet Take 200 mg by mouth daily    Historical Provider, MD       Physical Exam: Need 8 Elements   General: NAD  Eyes: EOMI  ENT: neck supple  Cardiovascular: Regular rate. Respiratory: Clear to auscultation  Gastrointestinal: Soft, non tender  Genitourinary: no suprapubic tenderness  Musculoskeletal: No edema  Skin: warm, dry  Neuro: Alert. Psych: Mood appropriate.        Past Medical History:   PMHx   Past Medical History:   Diagnosis Date    Atrial fibrillation ([facility] [address]     follows with Evelyn Youssef MD @ [facility] - taking Eliquis    BPH (benign prostatic hyperplasia)     Cancer ([facility] [address]     Basil skin nose    ED (erectile dysfunction) Hyperlipidemia     Hypertension      PSHX:  has a past surgical history that includes skin biopsy; back surgery (2017); Neck surgery (2016); Appendectomy (2012); Bowel resection (2014); ablation of dysrhythmic focus (2015); and Prostate surgery (03/2022). Allergies: No Known Allergies  Fam HX: Family history of HTN  Soc HX:   Social History     Socioeconomic History    Marital status:      Spouse name: None    Number of children: None    Years of education: None    Highest education level: None   Tobacco Use    Smoking status: Never    Smokeless tobacco: Never   Vaping Use    Vaping Use: Never used   Substance and Sexual Activity    Alcohol use: Yes     Alcohol/week: 2.0 standard drinks     Types: 1 Glasses of wine, 1 Shots of liquor per week    Drug use: Never    Sexual activity: Yes     Partners: Female       Medications:   Medications:    ciprofloxacin  250 mg Oral 2 times per day    [START ON 9/29/2022] amiodarone  200 mg Oral Daily    [START ON 9/29/2022] atorvastatin  10 mg Oral Daily    finasteride  5 mg Oral Daily    gabapentin  100 mg Oral BID    [START ON 9/29/2022] lisinopril  5 mg Oral Daily    psyllium  1 packet Oral BID    tamsulosin  0.4 mg Oral Daily      Infusions:   PRN Meds: traMADol, 50 mg, Q4H PRN  acetaminophen, 650 mg, Q4H PRN  ondansetron, 4 mg, Q6H PRN  oxyCODONE-acetaminophen, 1 tablet, Q6H PRN        Labs and Imaging   No results found. CBC:   Recent Labs     09/28/22  1118   WBC 12.5*   HGB 14.2        BMP:    Recent Labs     09/28/22  1118 09/28/22  1518    135   K 4.8 4.4   * 106   CO2 25 11*   BUN 17 7   CREATININE 0.8* <0.5*   GLUCOSE 89 47*     Hepatic: No results for input(s): AST, ALT, ALB, BILITOT, ALKPHOS in the last 72 hours.   Lipids: No results found for: CHOL, HDL, TRIG  Hemoglobin A1C: No results found for: LABA1C  TSH: No results found for: TSH  Troponin:   Lab Results   Component Value Date/Time    TROPONINT 0.011 08/24/2019 11:20 AM     Lactic Acid: No results for input(s): LACTA in the last 72 hours. BNP: No results for input(s): PROBNP in the last 72 hours.   UA:  Lab Results   Component Value Date/Time    NITRU NEGATIVE 08/24/2019 11:00 AM    COLORU YELLOW 08/24/2019 11:00 AM    WBCUA 80 TO 97 08/24/2019 11:00 AM    RBCUA 3 TO 5 08/24/2019 11:00 AM    BACTERIA MODERATE 08/24/2019 11:00 AM    CLARITYU CLOUDY 08/24/2019 11:00 AM    SPECGRAV 1.010 08/24/2019 11:00 AM    LEUKOCYTESUR MODERATE 08/24/2019 11:00 AM    UROBILINOGEN 0.2 08/24/2019 11:00 AM    BILIRUBINUR NEGATIVE 08/24/2019 11:00 AM    BLOODU SMALL 08/24/2019 11:00 AM    KETUA TRACE 08/24/2019 11:00 AM     Urine Cultures: No results found for: LABURIN  Blood Cultures: No results found for: BC  No results found for: BLOODCULT2  Organism: No results found for: [facility]    Personally reviewed Lab Studies, Imaging, and discussed with Dr Onesimo Luciano    Electronically signed by MICHAEL Gallego CNP on 9/28/2022 at 5:07 PM

## 2022-09-28 NOTE — PROGRESS NOTES
C/o post op pain in urethra  Chronic pain issues.   Chronic back issues    He does think he can go home with the discomfort or ride in care    Place in observation for pain control and monitor for hematuria  Advanced age

## 2022-09-28 NOTE — PROGRESS NOTES
1533 Pt. Brought back to unit, bedside report received from Patricia [facility]. Pt. Is A&O, VSS, pt provided with beverage of choice. Call light in reach. 1613 Pt. States he \"feels weird\" when Dr. Catherine Chicas at bedside, Vitals baseline of pt. 1622 Pt. Provided with pain medication per MD order. All belongings sent down with patient including huerta leg bag. Family to follow pt down to room.

## 2022-09-28 NOTE — ANESTHESIA POSTPROCEDURE EVALUATION
Department of Anesthesiology  Postprocedure Note    Patient: Bakari Degroot  MRN: [id number]  YOB: 1940  Date of evaluation: 9/28/2022      Procedure Summary     Date: 09/28/22 Room / Location: [address]    Anesthesia Start: 5312 Anesthesia Stop: 2553    Procedure: CYSTOSCOPY TRANSURETHRAL RESECTION PROSTATE (Urethra) Diagnosis:       Retention of urine, unspecified      (Retention of urine, unspecified [R33.9])    Surgeons: Dewayne Teixeira MD Responsible Provider: Basil Noble MD    Anesthesia Type: General ASA Status: 3          Anesthesia Type: General    Anai Phase I: Anai Score: 10    Anai Phase II: Anai Score: 10      Anesthesia Post Evaluation    Patient location during evaluation: PACU  Patient participation: complete - patient participated  Level of consciousness: awake and alert  Pain score: 1  Airway patency: patent  Nausea & Vomiting: no nausea and no vomiting  Complications: no  Cardiovascular status: blood pressure returned to baseline  Respiratory status: acceptable  Hydration status: euvolemic

## 2022-09-28 NOTE — PROGRESS NOTES
[phone] Pt arrived from OR. Monitors applied and alarms set. Report received from [facility] and [address] 1510 Taking ice chips  1515 BMP drawn and sent to lab.  [address] [facility] notified of return.

## 2022-09-28 NOTE — PROGRESS NOTES
4 Eyes Skin Assessment     NAME:  Celestino Precise  YOB: 1940  MEDICAL RECORD NUMBER:  [id number]    The patient is being assess for  Admission    I agree that 2 RN's have performed a thorough Head to Toe Skin Assessment on the patient. ALL assessment sites listed below have been assessed. Areas assessed by both nurses:    Head, Face, Ears, Shoulders, Back, Chest, Arms, Elbows, Hands, Sacrum. Buttock, Coccyx, Ischium, and Legs. Feet and Heels        Does the Patient have a Wound?  No noted wound(s)       Toribio Prevention initiated:  Yes   Wound Care Orders initiated:  No    Pressure Injury (Stage 3,4, Unstageable, DTI, NWPT, and Complex wounds) if present place referral/consult order under [de-identified] No    New and Established Ostomies if present place consult order under : No      Nurse 1 eSignature: Electronically signed by Ana Alfredo RN on 9/28/22 at 5:10 PM EDT    **SHARE this note so that the co-signing nurse is able to place an eSignature**    Nurse 2 eSignature: Electronically signed by Sabrina Johnson LPN on [birth year] at 0:60 PM EDT

## 2022-09-28 NOTE — ANESTHESIA PRE PROCEDURE
Department of Anesthesiology  Preprocedure Note       Name:  Giovanni Garland   Age:  79 y.o.  :  1940                                          MRN:  [id number]         Date:  2022      Surgeon: Pushpa Oleary):  Jameson Knight MD    Procedure: Procedure(s):  CYSTOSCOPY TRANSURETHRAL RESECTION PROSTATE    Medications prior to admission:   Prior to Admission medications    Medication Sig Start Date End Date Taking? Authorizing Provider   finasteride (PROSCAR) 5 MG tablet Take 5 mg by mouth daily   Yes Historical Provider, MD   gabapentin (NEURONTIN) 100 MG capsule Take 100 mg by mouth in the morning and at bedtime.    Yes Historical Provider, MD   lidocaine 4 % external patch Place 1 patch onto the skin daily   Yes Historical Provider, MD   psyllium (KONSYL) 28.3 % PACK Take 1 packet by mouth 2 times daily   Yes Historical Provider, MD   trimethoprim (TRIMPEX) 100 MG tablet Take 100 mg by mouth 2 times daily   Yes Historical Provider, MD   niacin (SLO-NIACIN) 500 MG extended release tablet Take 500 mg by mouth nightly   Yes Historical Provider, MD   sildenafil (VIAGRA) 25 MG tablet Take 25 mg by mouth as needed for Erectile Dysfunction   Yes Historical Provider, MD   atorvastatin (LIPITOR) 10 MG tablet Take 10 mg by mouth daily    Historical Provider, MD   ketorolac (ACULAR) 0.5 % ophthalmic solution 1 drop 4 times daily    Historical Provider, MD   tamsulosin (FLOMAX) 0.4 MG capsule Take 0.4 mg by mouth daily    Historical Provider, MD   lisinopril (PRINIVIL;ZESTRIL) 5 MG tablet Take 5 mg by mouth daily    Historical Provider, MD   apixaban (ELIQUIS) 5 MG TABS tablet Take 5 mg by mouth 2 times daily    Historical Provider, MD   prednisoLONE acetate (PRED FORTE) 1 % ophthalmic suspension 1 drop daily    Historical Provider, MD   amiodarone (CORDARONE) 200 MG tablet Take 200 mg by mouth daily    Historical Provider, MD       Current medications:    Current Facility-Administered Medications   Medication Dose Route Frequency Provider Last Rate Last Admin    lactated ringers infusion   IntraVENous Continuous Modesta De La Paz MD 50 mL/hr at 09/28/22 1126 New Bag at 09/28/22 1126    ceFAZolin (ANCEF) 2,000 mg in dextrose 5 % 50 mL IVPB (mini-bag)  2,000 mg IntraVENous Once Modesta De La Paz MD           Allergies:  No Known Allergies    Problem List:  There is no problem list on file for this patient. Past Medical History:        Diagnosis Date    Atrial fibrillation ([facility] [address]     follows with Lyubov Ernst MD @ [address] [place] [address] - taking Eliquis    BPH (benign prostatic hyperplasia)     Cancer ([facility] [address]     Basil skin nose    ED (erectile dysfunction)     Hyperlipidemia     Hypertension        Past Surgical History:        Procedure Laterality Date    ABLATION OF DYSRHYTHMIC FOCUS  2015    a-flutter / a-fib   x2    APPENDECTOMY  2012    BACK SURGERY  2017    lumbar    BOWEL RESECTION  2014    diverticulitis    NECK SURGERY  2016    cervical    PROSTATE SURGERY  03/2022    URO-LIFT    SKIN BIOPSY      BCC nose       Social History:    Social History     Tobacco Use    Smoking status: Never    Smokeless tobacco: Never   Substance Use Topics    Alcohol use:  Yes     Alcohol/week: 2.0 standard drinks     Types: 1 Glasses of wine, 1 Shots of liquor per week                                Counseling given: Not Answered      Vital Signs (Current):   Vitals:    09/22/22 1234 09/28/22 1115   BP:  (!) 155/90   Pulse:  67   Resp:  16   Temp:  36.8 °C (98.3 °F)   TempSrc:  Temporal   Weight: 199 lb (90.3 kg)    Height: 6' (1.829 m)                                               BP Readings from Last 3 Encounters:   09/28/22 (!) 155/90   08/24/19 (!) 183/83       NPO Status: Time of last liquid consumption: 2200                        Time of last solid consumption: 2200                        Date of last liquid consumption: 09/27/22                        Date of last solid food consumption: 09/27/22    BMI:   Wt Readings from Last 3 Encounters:   09/22/22 199 lb (90.3 kg)   08/24/19 205 lb (93 kg)     Body mass index is 26.99 kg/m². CBC:   Lab Results   Component Value Date/Time    WBC 12.5 09/28/2022 11:18 AM    RBC 4.80 09/28/2022 11:18 AM    HGB 14.2 09/28/2022 11:18 AM    HCT 44.8 09/28/2022 11:18 AM    MCV 93.3 09/28/2022 11:18 AM    RDW 14.3 09/28/2022 11:18 AM     09/28/2022 11:18 AM       CMP:   Lab Results   Component Value Date/Time     09/28/2022 11:18 AM    K 4.8 09/28/2022 11:18 AM     09/28/2022 11:18 AM    CO2 25 09/28/2022 11:18 AM    BUN 17 09/28/2022 11:18 AM    CREATININE 0.8 09/28/2022 11:18 AM    GFRAA >60 09/28/2022 11:18 AM    LABGLOM >60 09/28/2022 11:18 AM    GLUCOSE 89 09/28/2022 11:18 AM    PROT 6.4 08/24/2019 11:20 AM    CALCIUM 9.5 09/28/2022 11:18 AM    BILITOT 0.7 08/24/2019 11:20 AM    ALKPHOS 67 08/24/2019 11:20 AM    AST 44 08/24/2019 11:20 AM    ALT 31 08/24/2019 11:20 AM       POC Tests: No results for input(s): POCGLU, POCNA, POCK, POCCL, POCBUN, POCHEMO, POCHCT in the last 72 hours.     Coags: No results found for: PROTIME, INR, APTT    HCG (If Applicable): No results found for: PREGTESTUR, PREGSERUM, HCG, HCGQUANT     ABGs: No results found for: PHART, PO2ART, JMQ3KYP, MJU8DKC, BEART, P0MUCWOH     Type & Screen (If Applicable):  No results found for: LABABO, LABRH    Drug/Infectious Status (If Applicable):  No results found for: HIV, HEPCAB    COVID-19 Screening (If Applicable): No results found for: COVID19        Anesthesia Evaluation   no history of anesthetic complications:   Airway: Mallampati: II  TM distance: <3 FB   Neck ROM: full  Mouth opening: > = 3 FB   Dental: normal exam         Pulmonary:                              Cardiovascular:  Exercise tolerance: good (>4 METS),   (+) hypertension:, dysrhythmias: atrial fibrillation,       ECG reviewed                     ROS comment: EKG on 08/26/2019  Marked sinus bradycardia with first degree AV block   Nonspecific intraventricular conduction delay   Borderline ECG   No previous ECGs available      Neuro/Psych:               GI/Hepatic/Renal:             Endo/Other:    (+) malignancy/cancer. ROS comment: Basal skin nose  Abdominal:             Vascular: Other Findings:           Anesthesia Plan      general     ASA 3       Induction: intravenous. MIPS: Postoperative opioids intended and Prophylactic antiemetics administered. Anesthetic plan and risks discussed with patient. Plan discussed with attending.                     Gold Lebron, MICHAEL - PALAK   9/28/2022

## 2022-09-28 NOTE — H&P
H&P dated 9/27/2022 reviewed  -no changes    He is feeling well  No UTI symptoms  Voiding without catheter  On flomax and proscar  He has held eliquis    A, ox3, nad  Soft, nd/nt benign  Cta b  Rrr    Reviewed procedure  Discussed risks, benefits, AT  Risks include infection, bleeding, failure of therapy, need for further surgery, incontinence, ED    Plan  Proceed with TURP  Ancef on call to OR

## 2022-09-28 NOTE — OP NOTE
[address] [place] [address], [address] [place] [address]                                OPERATIVE REPORT    PATIENT NAME: Gerry Spatz                          :        1940  MED REC NO:   [id number]                          ROOM:  ACCOUNT NO:   [de-identified]                           ADMIT DATE: 2022  PROVIDER:     Rell Naylor MD    DATE OF PROCEDURE:  2022    PREOPERATIVE DIAGNOSES:  1. Chronic benign prostatic hyperplasia with lower urinary tract  symptoms. 2.  Intermittent urinary retention. POSTOPERATIVE DIAGNOSES:  1. Chronic benign prostatic hyperplasia with lower urinary tract  symptoms. 2.  Intermittent urinary retention. OPERATION PERFORMED:  Transurethral resection of prostate. SURGEON:  Rell Naylor MD    ANESTHESIA:  General.    ESTIMATED BLOOD LOSS:  Less than 50 mL. COMPLICATIONS:  None. SPECIMEN:  Prostatic chips. DRAINS PLACED:  A 24-Ukrainian three-way catheter with catheter plug. FINDINGS:  1. Resection time less than 1 hour and landmarks bladder neck to veru. 2.  Both UO seen and unharmed during the procedure. 3.  Two UroLift clips removed, one on right lateral prostatic lobe and  one on the left with more anterior location. 4.  Prostatic urethra widely patent after resection. 5.  Anterior tissue resected. 6.  Prostate 45 g on YORDY without nodules. 7.  Catheter irrigated clear and no leakage around catheter after TURP;  therefore, continuous bladder irrigation was not started. DISPOSITION:  Stable to PACU. INDICATIONS FOR PROCEDURE:  The patient is an 71-year-old male with  chronic benign prostatic hyperplasia and lower urinary tract symptoms. He has had symptoms for greater than three years. IPSS score was 14. The patient had intermittent episodes of urinary retention requiring  catheter placement. The patient had failed combined BPH medication and  UroLift.   I reviewed the risks, benefits, and alternative therapies. The patient elected to proceed with the surgery. His preoperative urine  culture was negative. The patient had held his Eliquis. OPERATIVE REPORT:  The patient received preoperative antibiotics. He  had compression boots in place. He was brought to the operating room  and placed on the operating room table. A general anesthetic was  administered by the Anesthesia Service. The patient was then placed in  dorsal lithotomy position and prepped and draped in a sterile fashion  after being appropriately padded. Time-out was performed per protocol. The patient did have mild meatal stenosis requiring dilation with sounds  to 28-Mexican in a sequential fashion. I was unable to place a 25-Mexican  continuous flow resectoscope into the bladder using a visual obturator. A 24-Mexican loop was used. The bladder was irrigated several times. There was mild bladder trabeculation. There were no concerning masses  or lesions found in the bladder. Both UOs were seen and unharmed. Using a 24-Mexican loop, I began the resection. The patient did have a  mild median lobe, which was resected to the veru. I then resected the  left and right lateral lobes. There was more resection done on the  right side of the prostatic lobe. Only two UroLift clips were found as  above. There was some anterior tissue that required resection. Following the resection from the bladder neck to the veru, the prostatic  urethra was widely patent visibly. The prostatic chips were irrigated  out as well as the UroLift clips. Both UOs were seen and unharmed. The  resection time was less than 1 hour. The prostatic urethra was then  fulgurated. Hemostasis was obtained. I confirm the prostatic chips  were completely removed and the prostatic chips were sent to pathology. I elected to end the procedure at this point in time.   A 24-Mexican  three-way catheter was placed and the balloon was inflated. The  catheter was irrigated and aspirated clear. There was no leakage around  the catheter. I then placed a catheter plug in the irrigation port. The catheter was connected to a drainage bag. A digital rectal  examination was performed, which demonstrated a 45 g prostate without  nodules. The patient tolerated procedure well and was brought to the  PACU in stable condition.     The patient will be discharged to home with a prescription for Cipro and  Ultram.  He will follow up in my office in approximately 7-10 days for  voiding trial.        Denise Alegre MD    D: 09/28/2022 15:00:17       T: 09/28/2022 15:02:50     [email or web address]  Job#: [id number]     Doc#: [id number]    CC:

## 2022-09-29 VITALS
DIASTOLIC BLOOD PRESSURE: 65 MMHG | HEIGHT: 72 IN | HEART RATE: 70 BPM | BODY MASS INDEX: 26.95 KG/M2 | SYSTOLIC BLOOD PRESSURE: 90 MMHG | WEIGHT: 199 LBS | OXYGEN SATURATION: 96 % | TEMPERATURE: 98.1 F | RESPIRATION RATE: 22 BRPM

## 2022-09-29 PROBLEM — R31.0 GROSS HEMATURIA: Status: RESOLVED | Noted: 2022-09-28 | Resolved: 2022-09-29

## 2022-09-29 LAB
ALBUMIN SERPL-MCNC: 3.8 GM/DL (ref 3.4–5)
ALP BLD-CCNC: 68 IU/L (ref 40–128)
ALT SERPL-CCNC: 10 U/L (ref 10–40)
ANION GAP SERPL CALCULATED.3IONS-SCNC: 9 MMOL/L (ref 4–16)
AST SERPL-CCNC: 17 IU/L (ref 15–37)
BILIRUB SERPL-MCNC: 0.9 MG/DL (ref 0–1)
BUN BLDV-MCNC: 15 MG/DL (ref 6–23)
CALCIUM SERPL-MCNC: 9.3 MG/DL (ref 8.3–10.6)
CHLORIDE BLD-SCNC: 105 MMOL/L (ref 99–110)
CO2: 25 MMOL/L (ref 21–32)
CREAT SERPL-MCNC: 0.9 MG/DL (ref 0.9–1.3)
GFR AFRICAN AMERICAN: >60 ML/MIN/1.73M2
GFR NON-AFRICAN AMERICAN: >60 ML/MIN/1.73M2
GLUCOSE BLD-MCNC: 100 MG/DL (ref 70–99)
HCT VFR BLD CALC: 44.4 % (ref 42–52)
HEMOGLOBIN: 14.1 GM/DL (ref 13.5–18)
MCH RBC QN AUTO: 29.1 PG (ref 27–31)
MCHC RBC AUTO-ENTMCNC: 31.8 % (ref 32–36)
MCV RBC AUTO: 91.7 FL (ref 78–100)
PDW BLD-RTO: 14 % (ref 11.7–14.9)
PLATELET # BLD: 251 K/CU MM (ref 140–440)
PMV BLD AUTO: 11.5 FL (ref 7.5–11.1)
POTASSIUM SERPL-SCNC: 4.6 MMOL/L (ref 3.5–5.1)
RBC # BLD: 4.84 M/CU MM (ref 4.6–6.2)
SODIUM BLD-SCNC: 139 MMOL/L (ref 135–145)
TOTAL PROTEIN: 5.3 GM/DL (ref 6.4–8.2)
WBC # BLD: 15.7 K/CU MM (ref 4–10.5)

## 2022-09-29 PROCEDURE — 36415 COLL VENOUS BLD VENIPUNCTURE: CPT

## 2022-09-29 PROCEDURE — G0378 HOSPITAL OBSERVATION PER HR: HCPCS

## 2022-09-29 PROCEDURE — 6370000000 HC RX 637 (ALT 250 FOR IP): Performed by: UROLOGY

## 2022-09-29 PROCEDURE — 80048 BASIC METABOLIC PNL TOTAL CA: CPT

## 2022-09-29 PROCEDURE — 85027 COMPLETE CBC AUTOMATED: CPT

## 2022-09-29 PROCEDURE — 80053 COMPREHEN METABOLIC PANEL: CPT

## 2022-09-29 RX ADMIN — AMIODARONE HYDROCHLORIDE 200 MG: 200 TABLET ORAL at 08:42

## 2022-09-29 RX ADMIN — FINASTERIDE 5 MG: 5 TABLET, FILM COATED ORAL at 08:41

## 2022-09-29 RX ADMIN — TAMSULOSIN HYDROCHLORIDE 0.4 MG: 0.4 CAPSULE ORAL at 08:41

## 2022-09-29 RX ADMIN — GABAPENTIN 100 MG: 100 CAPSULE ORAL at 08:41

## 2022-09-29 RX ADMIN — CIPROFLOXACIN 250 MG: 500 TABLET, FILM COATED ORAL at 08:41

## 2022-09-29 RX ADMIN — LISINOPRIL 5 MG: 5 TABLET ORAL at 08:41

## 2022-09-29 RX ADMIN — Medication 1 PACKET: at 08:42

## 2022-09-29 RX ADMIN — ATORVASTATIN CALCIUM 10 MG: 10 TABLET, FILM COATED ORAL at 08:41

## 2022-09-29 ASSESSMENT — ENCOUNTER SYMPTOMS
CHEST TIGHTNESS: 0
TROUBLE SWALLOWING: 0
EYE REDNESS: 0
VOMITING: 0
NAUSEA: 0
CONSTIPATION: 0
RHINORRHEA: 0
EYE PAIN: 0
COUGH: 0
SHORTNESS OF BREATH: 0
ABDOMINAL PAIN: 0
BACK PAIN: 0
DIARRHEA: 0

## 2022-09-29 NOTE — PLAN OF CARE
Problem: Discharge Planning  Goal: Discharge to home or other facility with appropriate resources  9/29/2022 1135 by Nikko Mojica RN  Outcome: Adequate for Discharge  9/29/2022 0127 by Becky Mckeon LPN  Outcome: Progressing  9/29/2022 0123 by Becky Mckeon LPN  Outcome: Progressing     Problem: Safety - Adult  Goal: Free from fall injury  9/29/2022 1135 by Nikko Mojica RN  Outcome: Adequate for Discharge  9/29/2022 0127 by Becky Mckeon LPN  Outcome: Progressing  9/29/2022 0123 by Becky Mckeon LPN  Outcome: Progressing     Problem: Pain  Goal: Verbalizes/displays adequate comfort level or baseline comfort level  9/29/2022 1135 by Nikko Mojica RN  Outcome: Adequate for Discharge  9/29/2022 0127 by Becky Mckeon LPN  Outcome: Progressing  9/29/2022 0123 by Becky Mckeon LPN  Outcome: Progressing

## 2022-09-29 NOTE — PROGRESS NOTES
V2.0  [facility] [place] Hospitalist Progress Note      Name:  Shereen Tirado /Age/Sex: 1940  (79 y.o. male)   MRN & CSN:  [id number] & [id number] Encounter Date/Time: 2022 10:16 AM EDT    Location:  4982/9457-F PCP: No primary care provider on file. Hospital Day: 2    Assessment and Plan:   Shereen Tirado is a 79 y.o. male with pmh of BPH, A. Fib, HTN, HLD, who presents with Gross hematuria      Plan:              Gross hematuria 2/2 Benign prostatic hyperplasia  Status post cystoscopy with TURP per Dr. Bambi Olson  On empiric ciprofloxacin per urology  Continue home Flomax, Proscar  Plan for discharge today    2. Atrial fibrillation  Follows with Dr. Vikram Adam at [facility] outpatient  Continue amiodarone, plan to keep eliquis on hold as per urology     3. Hypertension, essential  Continue home lisinopril. 4. Hyperlipidemia- continue home Lipitor    Diet ADULT DIET; Regular   DVT Prophylaxis [] Lovenox, []  Heparin, [x] SCDs, [] Ambulation,  [] Eliquis, [] Xarelto  [] Coumadin   Code Status Full Code   Disposition From: home  Expected Disposition: home  Estimated Date of Discharge: today   Surrogate Decision Maker/ POA      Subjective:     Chief Complaint: No chief complaint on file. Shereen Tirado is a 79 y.o. male who presents with gross hematuria. Doing well this morning, headache overnight which is improved. Review of Systems:    Review of Systems   Constitutional:  Negative for activity change, appetite change, fatigue and fever. HENT:  Negative for congestion, rhinorrhea and trouble swallowing. Eyes:  Negative for pain and redness. Respiratory:  Negative for cough, chest tightness and shortness of breath. Cardiovascular:  Negative for chest pain, palpitations and leg swelling. Gastrointestinal:  Negative for abdominal pain, constipation, diarrhea, nausea and vomiting. Genitourinary:  Positive for penile pain. Negative for difficulty urinating and dysuria.    Musculoskeletal:  Negative for back pain and myalgias. Skin:  Negative for rash. Neurological:  Negative for dizziness, syncope, light-headedness, numbness and headaches. Psychiatric/Behavioral:  Negative for confusion. Objective: Intake/Output Summary (Last 24 hours) at 9/29/2022 1016  Last data filed at 9/29/2022 0500  Gross per 24 hour   Intake 542.5 ml   Output 1505 ml   Net -962.5 ml        Vitals:   Vitals:    09/29/22 0820   BP: 90/65   Pulse: 70   Resp: 22   Temp: 98.1 °F (36.7 °C)   SpO2: 96%       Physical Exam:   Physical Exam  Constitutional:       General: He is not in acute distress. Appearance: He is normal weight. He is not diaphoretic. HENT:      Head: Normocephalic and atraumatic. Right Ear: Tympanic membrane normal.      Left Ear: Tympanic membrane normal.      Nose: Nose normal.      Mouth/Throat:      Mouth: Mucous membranes are moist.      Pharynx: Oropharynx is clear. Eyes:      Pupils: Pupils are equal, round, and reactive to light. Cardiovascular:      Rate and Rhythm: Normal rate and regular rhythm. Pulses: Normal pulses. Heart sounds: Normal heart sounds. No murmur heard. No friction rub. No gallop. Pulmonary:      Effort: Pulmonary effort is normal. No respiratory distress. Breath sounds: Normal breath sounds. No wheezing or rales. Abdominal:      General: Abdomen is flat. Bowel sounds are normal. There is no distension. Palpations: Abdomen is soft. Tenderness: There is no abdominal tenderness. There is no guarding. Genitourinary:     Comments: Clark in place  Musculoskeletal:         General: No swelling, tenderness or deformity. Normal range of motion. Cervical back: Normal range of motion and neck supple. Right lower leg: No edema. Left lower leg: No edema. Skin:     General: Skin is warm. Coloration: Skin is not jaundiced or pale. Findings: No bruising, erythema, lesion or rash. Neurological:      General: No focal deficit present. Mental Status: He is alert and oriented to person, place, and time. Mental status is at baseline. Cranial Nerves: No cranial nerve deficit. Sensory: No sensory deficit. Motor: No weakness.       Gait: Gait normal.   Psychiatric:         Mood and Affect: Mood normal.          Medications:   Medications:    ciprofloxacin  250 mg Oral 2 times per day    amiodarone  200 mg Oral Daily    atorvastatin  10 mg Oral Daily    finasteride  5 mg Oral Daily    gabapentin  100 mg Oral BID    lisinopril  5 mg Oral Daily    psyllium  1 packet Oral BID    tamsulosin  0.4 mg Oral Daily    enoxaparin  40 mg SubCUTAneous Nightly      Infusions:    dextrose       PRN Meds: traMADol, 50 mg, Q4H PRN  acetaminophen, 650 mg, Q4H PRN  ondansetron, 4 mg, Q6H PRN  oxyCODONE-acetaminophen, 1 tablet, Q6H PRN  metoprolol (LOPRESSOR) ivpb, 5 mg, Q6H PRN  glucose, 4 tablet, PRN  dextrose bolus, 125 mL, PRN   Or  dextrose bolus, 250 mL, PRN  glucagon (rDNA), 1 mg, PRN  dextrose, 1,000 mL, Continuous PRN        Labs      Recent Results (from the past 24 hour(s))   CBC with Auto Differential    Collection Time: 09/28/22 11:18 AM   Result Value Ref Range    WBC 12.5 (H) 4.0 - 10.5 K/CU MM    RBC 4.80 4.6 - 6.2 M/CU MM    Hemoglobin 14.2 13.5 - 18.0 GM/DL    Hematocrit 44.8 42 - 52 %    MCV 93.3 78 - 100 FL    MCH 29.6 27 - 31 PG    MCHC 31.7 (L) 32.0 - 36.0 %    RDW 14.3 11.7 - 14.9 %    Platelets 050 589 - 278 K/CU MM    MPV 11.3 (H) 7.5 - 11.1 FL    Segs Relative 29.0 (L) 36 - 66 %    Eosinophils % 1.0 0 - 3 %    Lymphocytes % 66.0 (H) 24 - 44 %    Monocytes % 4.0 0 - 4 %    Segs Absolute 3.6 K/CU MM    Eosinophils Absolute 0.1 K/CU MM    Lymphocytes Absolute 8.3 K/CU MM    Monocytes Absolute 0.5 K/CU MM    Differential Type MANUAL DIFFERENTIAL    Basic Metabolic Panel    Collection Time: 09/28/22 11:18 AM   Result Value Ref Range    Sodium 144 135 - 145 MMOL/L    Potassium 4.8 3.5 - 5.1 MMOL/L    Chloride 111 (H) 99 - 110 mMol/L CO2 25 21 - 32 MMOL/L    Anion Gap 8 4 - 16    BUN 17 6 - 23 MG/DL    Creatinine 0.8 (L) 0.9 - 1.3 MG/DL    Glucose 89 70 - 99 MG/DL    Calcium 9.5 8.3 - 10.6 MG/DL    GFR Non-African American >60 >60 mL/min/1.73m2    GFR African American >60 >60 NJ/LHX/0.67U2   Basic Metabolic Panel    Collection Time: 09/28/22  3:18 PM   Result Value Ref Range    Sodium 135 135 - 145 MMOL/L    Potassium 4.4 3.5 - 5.1 MMOL/L    Chloride 106 99 - 110 mMol/L    CO2 11 (L) 21 - 32 MMOL/L    Anion Gap 18 (H) 4 - 16    BUN 7 6 - 23 MG/DL    Creatinine <0.5 (L) 0.9 - 1.3 MG/DL    Glucose 47 (LL) 70 - 99 MG/DL    Calcium 7.0 (L) 8.3 - 10.6 MG/DL    GFR Non- NOT CALCULATED >60 mL/min/1.73m2    GFR  NOT CALCULATED >60 mL/min/1.73m2   POCT Glucose    Collection Time: 09/28/22  5:43 PM   Result Value Ref Range    POC Glucose 81 70 - 99 MG/DL   Comprehensive Metabolic Panel w/ Reflex to MG    Collection Time: 09/28/22  8:03 PM   Result Value Ref Range    Sodium 139 135 - 145 MMOL/L    Potassium 4.8 3.5 - 5.1 MMOL/L    Chloride 105 99 - 110 mMol/L    CO2 26 21 - 32 MMOL/L    BUN 15 6 - 23 MG/DL    Creatinine 0.9 0.9 - 1.3 MG/DL    Glucose 118 (H) 70 - 99 MG/DL    Calcium 9.2 8.3 - 10.6 MG/DL    Albumin 4.0 3.4 - 5.0 GM/DL    Total Protein 5.5 (L) 6.4 - 8.2 GM/DL    Total Bilirubin 0.7 0.0 - 1.0 MG/DL    ALT 12 10 - 40 U/L    AST 19 15 - 37 IU/L    Alkaline Phosphatase 66 40 - 129 IU/L    GFR Non-African American >60 >60 mL/min/1.73m2    GFR African American >60 >60 mL/min/1.73m2    Anion Gap 8 4 - 16   Lactic Acid    Collection Time: 09/28/22  8:03 PM   Result Value Ref Range    Lactate 1.9 0.4 - 2.0 mMOL/L   Procalcitonin    Collection Time: 09/28/22  8:03 PM   Result Value Ref Range    Procalcitonin 0.029    POCT Glucose    Collection Time: 09/28/22  9:52 PM   Result Value Ref Range    POC Glucose 114 (H) 70 - 99 MG/DL   CBC    Collection Time: 09/29/22  7:28 AM   Result Value Ref Range    WBC 15.7 (H) 4.0 - 10.5 K/CU MM    RBC 4.84 4.6 - 6.2 M/CU MM    Hemoglobin 14.1 13.5 - 18.0 GM/DL    Hematocrit 44.4 42 - 52 %    MCV 91.7 78 - 100 FL    MCH 29.1 27 - 31 PG    MCHC 31.8 (L) 32.0 - 36.0 %    RDW 14.0 11.7 - 14.9 %    Platelets 491 299 - 000 K/CU MM    MPV 11.5 (H) 7.5 - 11.1 FL   Comprehensive Metabolic Panel w/ Reflex to MG    Collection Time: 09/29/22  7:28 AM   Result Value Ref Range    Sodium 139 135 - 145 MMOL/L    Potassium 4.6 3.5 - 5.1 MMOL/L    Chloride 105 99 - 110 mMol/L    CO2 25 21 - 32 MMOL/L    BUN 15 6 - 23 MG/DL    Creatinine 0.9 0.9 - 1.3 MG/DL    Glucose 100 (H) 70 - 99 MG/DL    Calcium 9.3 8.3 - 10.6 MG/DL    Albumin 3.8 3.4 - 5.0 GM/DL    Total Protein 5.3 (L) 6.4 - 8.2 GM/DL    Total Bilirubin 0.9 0.0 - 1.0 MG/DL    ALT 10 10 - 40 U/L    AST 17 15 - 37 IU/L    Alkaline Phosphatase 68 40 - 128 IU/L    GFR Non-African American >60 >60 mL/min/1.73m2    GFR African American >60 >60 mL/min/1.73m2    Anion Gap 9 4 - 16        Imaging/Diagnostics Last 24 Hours   No results found.     Electronically signed by Atif Narvaez MD on 9/29/2022 at 10:16 AM

## 2022-09-29 NOTE — PLAN OF CARE
Problem: Discharge Planning  Goal: Discharge to home or other facility with appropriate resources  9/29/2022 0127 by Liliya Becker LPN  Outcome: Progressing  9/29/2022 0123 by Liliya Becker LPN  Outcome: Progressing     Problem: Safety - Adult  Goal: Free from fall injury  9/29/2022 0127 by Liliya Becker LPN  Outcome: Progressing  9/29/2022 0123 by Liliya Becker LPN  Outcome: Progressing     Problem: Pain  Goal: Verbalizes/displays adequate comfort level or baseline comfort level  9/29/2022 0127 by Liliya Becker LPN  Outcome: Progressing  9/29/2022 0123 by Liliya Becker LPN  Outcome: Progressing

## 2022-09-29 NOTE — DISCHARGE SUMMARY
[address] [facility] [address]   Discharge Summary   [facility] 0 1 2  Patient ID:   Linda Belcher   [id number]   15 y.o.   1940     Admit date: 9/28/2022     Admission Problem/Diagnosis: Retention of urine, unspecified [R33.9]  Gross hematuria [R31.0] Principal Problem (Resolved):    Gross hematuria  Active Problems:    * No active hospital problems. *      Admitting Physician: Carline Clarke MD     Discharge Diagnoses: Chronic benign prostatic hyperplasia with lower urinary tract symptoms. Condition at Discharge: Stable    Discharge date and time: Morning today, 9/29/22     Patient Instructions: Go to the ED if you develop chest pain, shortness of breath, gross hematuria with clots, or new/worsening symptoms. Current Discharge Medication List        START taking these medications    Details   ciprofloxacin (CIPRO) 250 MG tablet Take 1 tablet by mouth 2 times daily for 5 days  Qty: 10 tablet, Refills: 0      traMADol (ULTRAM) 50 MG tablet Take 1 tablet by mouth every 8 hours as needed for Pain for up to 5 days. Intended supply: 5 days. Take lowest dose possible to manage pain  Qty: 12 tablet, Refills: 0    Comments: Reduce doses taken as pain becomes manageable  Associated Diagnoses: Retention of urine, unspecified           CONTINUE these medications which have NOT CHANGED    Details   finasteride (PROSCAR) 5 MG tablet Take 5 mg by mouth daily      gabapentin (NEURONTIN) 100 MG capsule Take 100 mg by mouth in the morning and at bedtime.       lidocaine 4 % external patch Place 1 patch onto the skin daily      psyllium (KONSYL) 28.3 % PACK Take 1 packet by mouth 2 times daily      trimethoprim (TRIMPEX) 100 MG tablet Take 100 mg by mouth 2 times daily      niacin (SLO-NIACIN) 500 MG extended release tablet Take 500 mg by mouth nightly      sildenafil (VIAGRA) 25 MG tablet Take 25 mg by mouth as needed for Erectile Dysfunction      atorvastatin (LIPITOR) 10 MG tablet Take 10 mg by mouth daily ketorolac (ACULAR) 0.5 % ophthalmic solution 1 drop 4 times daily      tamsulosin (FLOMAX) 0.4 MG capsule Take 0.4 mg by mouth daily      lisinopril (PRINIVIL;ZESTRIL) 5 MG tablet Take 5 mg by mouth daily      apixaban (ELIQUIS) 5 MG TABS tablet Take 5 mg by mouth 2 times daily      prednisoLONE acetate (PRED FORTE) 1 % ophthalmic suspension 1 drop daily      amiodarone (CORDARONE) 200 MG tablet Take 200 mg by mouth daily              Activity: activity as tolerated, no strenuous exercise or activity    Diet: regular diet, use OTC stool softeners to promote regular bowel movements    Wound Care: Keep catheter secured to leg via securing device. Follow-up for catheter removal morning of 10/3/22. Consults: Internal medicine    Procedures Performed & Treatment Rendered: Cystoscopy, transurethral resection of prostate    Summary of Hospital Stay/ Conclusions at Discharge: Orlando Pinzon was admitted 9/28/22 following his cystoscopy, transurethral resection of prostate for treatment of his chronic benign prostatic hyperplasia with lower urinary tract symptoms. He developed post-op pain in his urethra and aggravated back pain, for which he was kept overnight for monitoring. This morning, he is feeling much improved and is comfortable with going home. His urine is clear charissa and draining nicely. He will be discharged with the catheter and Ultram/Cipro. Patient instructed to hold his Eliquis for 10 days. Follow up in our office for voiding trial 10/3/22 and in 2 weeks with Dr. Андрей Mendez for reevaluation.     Electronically signed by Octaviano Rodriguez PA-C on 9/29/2022 at 9:26 AM

## 2022-09-29 NOTE — PROGRESS NOTES
[facility]   Progress Note  [facility] 0 1 2      Date: 2022   Patient: Rachel Boswell   : 1940   DOA: 2022   MRN: [id number]   ROOM#: [id number]     Admit Date: 2022     Collaborating Urologist at time of admission: Dr. Jed Patel  CC: Gross hematuria  POD #1: Cystoscopy, transurethral resection of prostate    Subjective:     Pain: mild, no nausea and no vomiting,   Bowel Movement/Flatus:   passing flatus  Voiding:  Indwelling catheter with charissa colored urine, draining well    Pt resting in bed, states he is feeling well this morning with improved penile pain and headache. He is comfortable with being discharged.     Objective:    Vitals:    /70   Pulse 58   Temp 97.8 °F (36.6 °C) (Oral)   Resp 12   Ht 6' (1.829 m)   Wt 199 lb (90.3 kg)   SpO2 98%   BMI 26.99 kg/m²    Temp  Av.5 °F (36.4 °C)  Min: 96.8 °F (36 °C)  Max: 98.5 °F (36.9 °C)     Intake/Output Summary (Last 24 hours) at 2022 0759  Last data filed at 2022 0500  Gross per 24 hour   Intake 542.5 ml   Output 1505 ml   Net -962.5 ml     Physical Exam:   General appearance: alert, appears stated age, cooperative, no distress, and mildly obese  Head: Normocephalic, without obvious abnormality, atraumatic  Back:  No CVA tenderness  Abdomen:  Soft, non-tender, non-distended  : 24fr 3-way catheter in place, urine clear charissa-colored    Labs:   WBC:    Lab Results   Component Value Date/Time    WBC 12.5 2022 11:18 AM      Hemoglobin/Hematocrit:    Lab Results   Component Value Date/Time    HGB 14.2 2022 11:18 AM    HCT 44.8 2022 11:18 AM      BMP:   Lab Results   Component Value Date/Time     2022 08:03 PM    K 4.8 2022 08:03 PM     2022 08:03 PM    CO2 26 2022 08:03 PM    BUN 15 2022 08:03 PM    LABALBU 4.0 2022 08:03 PM    CREATININE 0.9 2022 08:03 PM    CALCIUM 9.2 2022 08:03 PM    GFRAA >60 2022 08:03 PM    LABGLOM >60 09/28/2022 08:03 PM     Imaging:   No results found. Assessment & Plan:      Vicky Freire is a 79 y.o. male admitted 9/28/2022 for gross hematuria. 1) BPH: S/p cystoscopy, transurethral resection of prostate 9/28/22. Prostatic urethra widely patent after resection. Labwork stable and AF/VSS   Continue holding Eliquis for 10 more days. Plan for discharge home with a prescription for Cipro and Ultram.   Continue catheter upon discharge. Plan for voiding trial in our office Monday, 10/3/22. 2) Gross Hematuria: Resolved. Anticipate discharge home today. Patient seen and examined, chart reviewed.      Electronically signed by Kulwinder Cherry PA-C on 9/29/2022 at 7:59 AM

## 2022-09-29 NOTE — PLAN OF CARE
Problem: Discharge Planning  Goal: Discharge to home or other facility with appropriate resources  9/29/2022 0123 by Cristian Ma LPN  Outcome: Progressing  9/28/2022 1729 by Ana Alfredo RN  Outcome: Progressing     Problem: Safety - Adult  Goal: Free from fall injury  9/29/2022 0123 by Cristian Ma LPN  Outcome: Progressing  9/28/2022 1729 by Ana Alfredo RN  Outcome: Progressing     Problem: Pain  Goal: Verbalizes/displays adequate comfort level or baseline comfort level  9/29/2022 0123 by Cristian Ma LPN  Outcome: Progressing  9/28/2022 1729 by Ana Alfredo RN  Outcome: Progressing

## (undated) DEVICE — PACK,CYSTOSCOPY,PK I,AURORA: Brand: MEDLINE

## (undated) DEVICE — SET IRRIG L94IN ID0.281IN W/ 4.5IN DST FLX CONN 2 LD ON OFF

## (undated) DEVICE — PREMIUM WET SKIN PREP TRAY: Brand: MEDLINE INDUSTRIES, INC.

## (undated) DEVICE — GOWN,ECLIPSE,POLYRNF,BRTHSLV,XL,30/CS: Brand: MEDLINE

## (undated) DEVICE — TRAY PREP DRY W/ PREM GLV 2 APPL 6 SPNG 2 UNDPD 1 OVERWRAP

## (undated) DEVICE — PLUG CATH F UNIV ENDCAP FOR OCCL DRNGE LUMN DISP

## (undated) DEVICE — TOWEL,OR,DSP,ST,BLUE,STD,6/PK,12PK/CS: Brand: MEDLINE

## (undated) DEVICE — GOWN,SIRUS,POLYRNF,BRTHSLV,XLN/XL,20/CS: Brand: MEDLINE

## (undated) DEVICE — Z INACTIVE PER PHARM JELLY LUBRICANT 5 GM BACTERIOSTATIC

## (undated) DEVICE — GUIDEWIRE ENDOSCP L150CM DIA0.035IN TIP 3CM PTFE NIT

## (undated) DEVICE — SYRINGE CATH TIP 50ML

## (undated) DEVICE — DRAINBAG,ANTI-REFLUX TOWER,L/F,2000ML,LL: Brand: MEDLINE

## (undated) DEVICE — MARKER SURG SKIN UTIL REGULAR/FINE 2 TIP W/ RUL AND 9 LBL

## (undated) DEVICE — CATHETER,FOLEY,3WAY,SILI-ELAST,24FR,30ML: Brand: MEDLINE

## (undated) DEVICE — CONTAINER,SPECIMEN,4OZ,OR STRL: Brand: MEDLINE

## (undated) DEVICE — GLOVE SURG SZ 65 CRM LTX FREE POLYISOPRENE POLYMER BEAD ANTI

## (undated) DEVICE — ELECTRODE LOOP 24FR R ANG MPLR CUT

## (undated) DEVICE — GOWN,ECLIPSE,POLYRNF,BRTHSLV,L,30/CS: Brand: MEDLINE

## (undated) DEVICE — CABLE ENDOSCP L10FT ACT DISP

## (undated) DEVICE — GLOVE ORANGE PI 7 1/2   MSG9075

## (undated) DEVICE — UROLOGIC DRAIN BAG: Brand: UNBRANDED